# Patient Record
Sex: FEMALE | Race: ASIAN | NOT HISPANIC OR LATINO | Employment: UNEMPLOYED | URBAN - METROPOLITAN AREA
[De-identification: names, ages, dates, MRNs, and addresses within clinical notes are randomized per-mention and may not be internally consistent; named-entity substitution may affect disease eponyms.]

---

## 2023-05-09 LAB
ALLERGEN ANIMAL: CAT DANDER IGE (KU/L): <0.1 KU/L
ALLERGEN ANIMAL: DOG DANDER IGE (KU/L): <0.1 KU/L
ALLERGEN GRASS: BERMUDA GRASS (CYNODON DACTYLON) IGE (KU/L): <0.1 KU/L
ALLERGEN GRASS: JOHNSON GRASS (SORGHUM HALEPENSE) IGE (KU/L): <0.1 KU/L
ALLERGEN GRASS: MEADOW GRASS, KENTUCKY BLUE (POA PRATENSIS )IGE (KU/L): <0.1 KU/L
ALLERGEN GRASS: TIMOTHY GRASS (PHLEUM PRATENSE) IGE (KU/L): <0.1 KU/L
ALLERGEN INSECT: COCKROACH IGE: <0.1 KU/L
ALLERGEN MICROORGANISM: ALTERNARIA ALTERNATA IGE (KU/L): <0.1 KU/L
ALLERGEN MICROORGANISM: ASPERGILLUS FUMIGATUS IGE (KU/L): <0.1 KU/L
ALLERGEN MICROORGANISM: CLADOSPORIUM HERBARUM IGE (KU/L): <0.1 KU/L
ALLERGEN MICROORGANISM: PENICILLIUM CHRYSOGENUM (P. NOTATUM) IGE (KU/L): <0.1 KU/L
ALLERGEN MITE: DERMATOPHAGOIDES FARINAE (HOUSE DUST MITE) IGE (KU/L): <0.1 KU/L
ALLERGEN MITE: DERMATOPHAGOIDES PTERONYSSINUS (HOUSE DUST MITE) IGE (KU/L): <0.1 KU/L
ALLERGEN TREE: BOX-ELDER (ACER NEGUNDO) IGE (KU/L): <0.1 KU/L
ALLERGEN TREE: COMMON SILVER BIRCH (BETULA VERRUCOSA) IGE (KU/L): <0.1 KU/L
ALLERGEN TREE: COTTONWOOD (POPULUS DELTOIDES) IGE (KU/L): <0.1 KU/L
ALLERGEN TREE: ELM (ULMUS AMERICANA) IGE (KU/L): <0.1 KU/L
ALLERGEN TREE: MAPLE LEAF SYCAMORE, LONDON PLANE IGE (KU/L): <0.1 KU/L
ALLERGEN TREE: MOUNTAIN JUNIPER (JUNIPERUS SABINOIDES) IGE (KU/L): <0.1 KU/L
ALLERGEN TREE: MULBERRY (MORUS ALBA) IGE (KU/L): <0.1 KU/L
ALLERGEN TREE: OAK (QUERCUS ALBA) IGE (KU/L): <0.1 KU/L
ALLERGEN TREE: PECAN, HICKORY (CARYA PECAN) IGE (KU/L): <0.1 KU/L
ALLERGEN TREE: WALNUT IGE: <0.1 KU/L
ALLERGEN TREE: WHITE ASH (FRAXINUS AMERICANA) IGE (KU/L): <0.1 KU/L
ALLERGEN WEED: COMMON PIGWEED (AMARANTHUS RETROFLEXUS) IGE (KU/L): <0.1 KU/L
ALLERGEN WEED: COMMON RAGWEED (AMB. ARTEMISIIFOLIA/A. ELATIOR) IGE (KU/L): <0.1 KU/L
ALLERGEN WEED: GOOSEFOOT, LAMB'S QUARTERS (CHENOPODIUM ALBUM) IGE (KU/L): <0.1 KU/L
ALLERGEN WEED: PLANTAIN (ENGLISH), RIBWORT (PLANTAGO LANCEOLATA) IGE (KU/L): <0.1 KU/L
ALLERGEN WEED: PRICKLY SALTWORT/RUSSIAN THISTLE (SALSOLA KALI) IGE (KU/L): <0.1 KU/L
ALLERGEN WEED: SHEEP SORREL (RUMEX ACETOSELLA) IGE (KU/L): <0.1 KU/L
IMMUNOCAP IGE: 27 KU/L (ref 0–214)
IMMUNOCAP INTERPRETATION: NORMAL

## 2024-01-31 PROBLEM — M25.511 RIGHT SHOULDER PAIN: Status: ACTIVE | Noted: 2024-01-31

## 2024-01-31 PROBLEM — R22.2 CHEST WALL MASS: Status: ACTIVE | Noted: 2024-01-31

## 2024-01-31 PROBLEM — M94.0 ACUTE COSTOCHONDRITIS: Status: ACTIVE | Noted: 2024-01-31

## 2024-01-31 PROBLEM — E55.9 VITAMIN D DEFICIENCY: Status: ACTIVE | Noted: 2018-12-07

## 2024-01-31 PROBLEM — K21.9 GERD (GASTROESOPHAGEAL REFLUX DISEASE): Status: ACTIVE | Noted: 2024-01-31

## 2024-01-31 PROBLEM — R07.9 CHEST PAIN: Status: ACTIVE | Noted: 2024-01-31

## 2024-02-29 ENCOUNTER — HOSPITAL ENCOUNTER (OUTPATIENT)
Dept: RADIOLOGY | Facility: EXTERNAL LOCATION | Age: 49
Discharge: HOME | End: 2024-02-29

## 2024-02-29 ENCOUNTER — OFFICE VISIT (OUTPATIENT)
Dept: PRIMARY CARE | Facility: CLINIC | Age: 49
End: 2024-02-29
Payer: COMMERCIAL

## 2024-02-29 VITALS
DIASTOLIC BLOOD PRESSURE: 70 MMHG | TEMPERATURE: 97.3 F | HEIGHT: 65 IN | BODY MASS INDEX: 31.82 KG/M2 | SYSTOLIC BLOOD PRESSURE: 109 MMHG | HEART RATE: 71 BPM | OXYGEN SATURATION: 99 % | WEIGHT: 191 LBS

## 2024-02-29 DIAGNOSIS — Z00.00 VISIT FOR PREVENTIVE HEALTH EXAMINATION: Primary | ICD-10-CM

## 2024-02-29 DIAGNOSIS — Z12.11 SCREENING FOR MALIGNANT NEOPLASM OF COLON: ICD-10-CM

## 2024-02-29 DIAGNOSIS — Z12.31 ENCOUNTER FOR SCREENING MAMMOGRAM FOR MALIGNANT NEOPLASM OF BREAST: ICD-10-CM

## 2024-02-29 LAB
NON-UH HIE A/G RATIO: 1
NON-UH HIE ALB: 3.6 G/DL (ref 3.4–5)
NON-UH HIE ALK PHOS: 104 UNIT/L (ref 45–117)
NON-UH HIE APPEARANCE, U: NORMAL
NON-UH HIE BACTERIA, U: NORMAL
NON-UH HIE BILIRUBIN, TOTAL: 0.5 MG/DL (ref 0.3–1.2)
NON-UH HIE BILIRUBIN, U: NEGATIVE
NON-UH HIE BLOOD, U: NEGATIVE
NON-UH HIE BUN/CREAT RATIO: 12.2
NON-UH HIE BUN: 11 MG/DL (ref 9–23)
NON-UH HIE CALCIUM: 9.7 MG/DL (ref 8.7–10.4)
NON-UH HIE CALCULATED LDL CHOLESTEROL: 82 MG/DL (ref 60–130)
NON-UH HIE CALCULATED OSMOLALITY: 276 MOSM/KG (ref 275–295)
NON-UH HIE CHLORIDE: 107 MMOL/L (ref 98–107)
NON-UH HIE CHOLESTEROL: 165 MG/DL (ref 100–200)
NON-UH HIE CO2, VENOUS: 25 MMOL/L (ref 20–31)
NON-UH HIE COLOR, U: YELLOW
NON-UH HIE CREATININE: 0.9 MG/DL (ref 0.5–0.8)
NON-UH HIE GFR AA: >60
NON-UH HIE GLOBULIN: 3.7 G/DL
NON-UH HIE GLOMERULAR FILTRATION RATE: >60 ML/MIN/1.73M?
NON-UH HIE GLUCOSE QUAL, U: NEGATIVE
NON-UH HIE GLUCOSE: 79 MG/DL (ref 74–106)
NON-UH HIE GOT: 21 UNIT/L (ref 15–37)
NON-UH HIE GPT: 20 UNIT/L (ref 10–49)
NON-UH HIE HCT: 42.4 % (ref 36–46)
NON-UH HIE HDL CHOLESTEROL: 62 MG/DL (ref 40–60)
NON-UH HIE HGB: 14.6 G/DL (ref 12–16)
NON-UH HIE INSTR WBC ND: 10
NON-UH HIE K: 4.6 MMOL/L (ref 3.5–5.1)
NON-UH HIE KETONES, U: NEGATIVE
NON-UH HIE LEUKOCYTE ESTERASE, U: NEGATIVE
NON-UH HIE MCH: 27.5 PG (ref 27–34)
NON-UH HIE MCHC: 34.3 G/DL (ref 32–37)
NON-UH HIE MCV: 80.1 FL (ref 80–100)
NON-UH HIE MPV: 9 FL (ref 7.4–10.4)
NON-UH HIE NA: 139 MMOL/L (ref 135–145)
NON-UH HIE NITRITE, U: NEGATIVE
NON-UH HIE PH, U: 5 (ref 4.5–8)
NON-UH HIE PLATELET: 278 X10 (ref 150–450)
NON-UH HIE PROTEIN, U: NEGATIVE
NON-UH HIE RBC/HPF, U: <1 #/HPF (ref 0–3)
NON-UH HIE RBC: 5.3 X10 (ref 4.2–5.4)
NON-UH HIE RDW: 15.6 % (ref 11.5–14.5)
NON-UH HIE SPECIFIC GRAVITY, U: 1.01 (ref 1–1.03)
NON-UH HIE SQUAMOUS EPITHELIAL CELLS, U: 3 #/HPF
NON-UH HIE TOTAL CHOL/HDL CHOL RATIO: 2.7
NON-UH HIE TOTAL PROTEIN: 7.3 G/DL (ref 5.7–8.2)
NON-UH HIE TRIGLYCERIDES: 105 MG/DL (ref 30–150)
NON-UH HIE TSH: 0.55 UIU/ML (ref 0.55–4.78)
NON-UH HIE U MICRO: NORMAL
NON-UH HIE UROBILINOGEN QUAL, U: NORMAL
NON-UH HIE WBC/HPF, U: 3 #/HPF (ref 0–5)
NON-UH HIE WBC: 10 X10 (ref 4.5–11)

## 2024-02-29 PROCEDURE — 99396 PREV VISIT EST AGE 40-64: CPT | Performed by: FAMILY MEDICINE

## 2024-02-29 PROCEDURE — 93000 ELECTROCARDIOGRAM COMPLETE: CPT | Performed by: FAMILY MEDICINE

## 2024-02-29 PROCEDURE — 1036F TOBACCO NON-USER: CPT | Performed by: FAMILY MEDICINE

## 2024-02-29 RX ORDER — CHOLECALCIFEROL (VITAMIN D3) 25 MCG
TABLET ORAL
COMMUNITY

## 2024-02-29 RX ORDER — PROGESTERONE 100 MG/1
100 CAPSULE ORAL DAILY
COMMUNITY

## 2024-02-29 RX ORDER — SODIUM CHLORIDE/ALOE VERA
GEL (GRAM) NASAL
COMMUNITY
Start: 2023-05-08

## 2024-02-29 RX ORDER — LEVOTHYROXINE SODIUM 112 UG/1
TABLET ORAL
COMMUNITY
Start: 2023-12-04

## 2024-02-29 RX ORDER — TRIAMCINOLONE ACETONIDE 1 MG/G
CREAM TOPICAL
COMMUNITY
Start: 2024-02-08

## 2024-02-29 NOTE — PROGRESS NOTES
Subjective   Patient ID: Juliana Maldonado is a 48 y.o. female who presents for Annual Exam.    Assessment/Plan     Problem List Items Addressed This Visit    None  Visit Diagnoses       Visit for preventive health examination    -  Primary    Relevant Orders    TSH with reflex to Free T4 if abnormal    Lipid Panel    Comprehensive Metabolic Panel    CBC    Urinalysis with Reflex Microscopic    CT cardiac scoring wo IV contrast    ECG 12 lead (Clinic Performed) (Completed)    Screening for malignant neoplasm of colon        Relevant Orders    Cologuard® colon cancer screening    Encounter for screening mammogram for malignant neoplasm of breast        Relevant Orders    BI mammo bilateral screening tomosynthesis (Completed)        Discussed about diet exercise  Discussed about age-related immunization  Discussed about fasting lab work  Follow-up every year for physical  Mammogram order provided  Pap smear follow-up with OB/GYN  Colon cancer screening Cologuard today  Calcium score ordered  Come back early with any new signs and symptoms  Patient understood and agreed with plan.    HPI    48-year-old female new patient to me here for physical    No smoking or alcohol    Exercise - walking    Wants see gyn for uterine fibroid    No new signs and symptoms today  Hypothyroidism taking levothyroxine  Check lab work today fasting lab work    Received immunization      No Known Allergies    Current Outpatient Medications   Medication Sig Dispense Refill    [place] Saline gel topical gel       cholecalciferol (Vitamin D-3) 25 MCG (1000 UT) tablet Take by mouth.      progesterone (Prometrium) 100 mg capsule Take 1 capsule (100 mg) by mouth once daily.      Synthroid 112 mcg tablet       triamcinolone (Kenalog) 0.1 % cream Apply twice daily for 2 weeks, to neck and back.       No current facility-administered medications for this visit.       Objective   Visit Vitals  /70 (BP Location: Left arm, Patient Position:  "Sitting)   Pulse 71   Temp 36.3 °C (97.3 °F)   Ht 1.651 m (5' 5\")   Wt 86.6 kg (191 lb)   SpO2 99%   BMI 31.78 kg/m²   Smoking Status Never   BSA 1.99 m²     Physical Exam    Constitutional   General appearance: Alert and in no acute distress.   Head and Face   Head and face: Normal.     Palpation of the face and sinuses: Normal.    Eyes   Inspection of eyes: Sclera and conjunctiva were normal.    Pupil exam: Pupils were equal in size. Extraocular movements were intact.   Ears, Nose, Mouth, and Throat   Ears: Auricles: Normal.    Otoscopic examination: Tympanic membranes: Normal with no congestion and no discharge. Otic Canals: Normal without tenderness, congestion or discharge.    Hearing: Normal.     Nasal mucosa, septum, and turbinates: Normal without edema or erythema.    Lips, teeth, and gums: Normal.    Oropharynx: Normal with moist mucus membranes, no congestion. Tonsils: Normal no follicles.   Neck   Neck Exam: Appearance of the neck was normal. No neck masses observed.    Thyroid exam: Not enlarged and no palpable thyroid nodules.   Pulmonary   Respiratory assessment: No respiratory distress, normal respiratory rhythm and effort.    Auscultation of Lungs: Clear bilateral breath sounds.   Cardiovascular   Auscultation of heart: Apical pulse normal, heart rate and rhythm normal, normal S1 and S2, no murmurs and no pericardial rub.    Carotid arteries: Pulses normal with no bruits.    Exam for edema: No peripheral edema.   Chest   Chest: Normal A_P diameter, no pulsation, no intercostal withdrawing. Trachea central.   Abdomen   Abdominal Exam: No bruits, normal bowel sounds, soft, non-tender, no abdominal mass palpated.    Liver and Spleen exam: No hepato-splenomegaly.    Examination for hernias: Normal.      Lymphatic   Palpation of lymph nodes in neck: No cervical lymphadenopathy.   Musculoskeletal   Examination of gait: Normal.    Inspection of digits and nails: No clubbing or cyanosis of the fingernails.  "   Inspection/palpation of joints, bones and muscles: No joint swelling. Normal movement of all extremities.    Range of Motion: Normal movement of all extremities.   Skin   Skin inspection: Normal skin color and pigmentation, normal skin turgor and no visible rash.   Neurologic   Cranial nerves: Nerves 2-12 were intact, no focal neuro defects.    Reflexes: Normal.     Sensation: Normal.     Coordination: Normal.    Psychiatric   Judgment and insight: Intact.    Orientation: Oriented to person, place, and time.    Recent and remote memory: Normal.     Mood and affect: Normal.     Genitourinary -follow-up with OB/GYN   Immunization History   Administered Date(s) Administered    Flu vaccine (IIV4), preservative free *Check age/dose* 10/25/2016, 09/14/2017, 09/15/2020    Flu vaccine, quadrivalent, no egg protein, age 6 month or greater (FLUCELVAX) 11/14/2019, 10/08/2023    Flu vaccine, quadrivalent, recombinant, preservative free, adult (FLUBLOK) 09/28/2021, 09/19/2022    Moderna COVID-19 vaccine, Fall 2023, 12 yeasrs and older (50mcg/0.5mL) 10/08/2023    Moderna SARS-CoV-2 Vaccination 04/09/2021, 05/07/2021, 11/15/2021, 07/26/2022    Pfizer COVID-19 vaccine, bivalent, age 12 years and older (30 mcg/0.3 mL) 12/01/2022       Review of Systems    No visits with results within 4 Month(s) from this visit.   Latest known visit with results is:   Orders Only on 05/08/2023   Component Date Value Ref Range Status    Immunocap IgE 05/08/2023 27.0  0.0 - 214.0 KU/L Final    [place]erm[place] Grass IgE 05/08/2023 <0.10  <0.35 KU/L Final    Marvin Grass IgE 05/08/2023 <0.10  <0.35 KU/L Final    [place] Grass, [place] Blue IgE 05/08/2023 <0.10  <0.35 KU/L Final    Graham Grass IgE 05/08/2023 <0.10  <0.35 KU/L Final    [place], [place] IgE 05/08/2023 <0.10  <0.35 KU/L Final    Common Pigweed IgE 05/08/2023 <0.10  <0.35 KU/L Final    Common Ragweed IgE 05/08/2023 <0.10  <0.35 KU/L Final    White Pedro Luis IgE 05/08/2023 <0.10  <0.35 KU/L  Final    Common Silver Birch IgE 2023 <0.10  <0.35 KU/L Final    Box-Elder IgE 2023 <0.10  <0.35 KU/L Final    Mountain Juniper IgE 2023 <0.10  <0.35 KU/L Final    [place] IgE 2023 <0.10  <0.35 KU/L Final    Elm IgE 2023 <0.10  <0.35 KU/L Final    Tyler IgE 2023 <0.10  <0.35 KU/L Final    Oak IgE 2023 <0.10  <0.35 KU/L Final    Pecan, Kidder IgE 2023 <0.10  <0.35 KU/L Final    Maple [place], [place] * 2023 <0.10  <0.35 KU/L Final    [place]une[place] Tree IgE 2023 <0.10  <0.35 KU/L Final    Prickly Saltwort/Russian Thistle I* 2023 <0.10  <0.35 KU/L Final    [place] IgE 2023 <0.10  <0.35 KU/L Final    Cat Dander IgE 2023 <0.10  <0.35 KU/L Final    Dog Dander IgE 2023 <0.10  <0.35 KU/L Final    Alternaria Alternata IgE 2023 <0.10  <0.35 KU/L Final    Aspergillus Fumigatus IgE 2023 <0.10  <0.35 KU/L Final    Cladosporium Herbarum IgE 2023 <0.10  <0.35 KU/L Final    Penicillium Chrysogenum (P. notatu* 2023 <0.10  <0.35 KU/L Final    Plantain IgE 2023 <0.10  <0.35 KU/L Final    Dust Mite (D. farinae) IgE 2023 <0.10  <0.35 KU/L Final    Dust Mite (D. pteronyssinus) IgE 2023 <0.10  <0.35 KU/L Final    Algerian Cockroach IgE 2023 <0.10  <0.35 KU/L Final    Immunocap Interpretation 2023 SEE COMMENT   Final       Radiology: Reviewed imaging in powerchart.  US pelvis transvaginal    Result Date: 2024  [facility] P[facility]TR[facility] REPORT Juliana Maldonado is a  48 year old  who presents for Pelvic pain in female , pmb. Pelvic Ultrasound transvaginal Procedure Date:  2024 LMP: Patient's last menstrual period was 2022 (exact date). Patient is menopausal. PMB 23-24                  Uterus: 6.6 x4.7 x4.3cm anteverted Possible fibroid uterus fundal 1.3 x1.2 x1.3cm  (1.6cm ) Endo: 8.0 mm (12.7mm ) Right Ovary:  1.8 x1.5  x1.2 cm  Right Ovarian Follicle 0.4 x0.5 x0.3 cm (0.7cm 11/20) Left Ovary:  1.5 x1.1 x1.1 cm  Left Ovarian Follicle 0.3 x0.3 x0.2 cm (1.4cm 11/20) Free Fluid:  NO Sonographer's Impression:  Heterogenous Uterus, possible Fibroid Uterus, Ovarian Follicles seen Bilaterally, Thickened Endometrium , nabothian cyst 0.6 x0.5 x0.6cm (0.6cm 11/20) Additional comments:  limited exam due to peristalsing bowel Doppler flow unavailable. Maisha Mora [facility]       Sonographer    Uterus normal size; fibroid stable 1.3 cm fundal. endometrium 8.0 mm no free fluid.       No family history on file.  Social History     Socioeconomic History    Marital status:      Spouse name: None    Number of children: None    Years of education: None    Highest education level: None   Occupational History    None   Tobacco Use    Smoking status: Never    Smokeless tobacco: Never   Substance and Sexual Activity    Alcohol use: Never    Drug use: Never    Sexual activity: None   Other Topics Concern    None   Social History Narrative    None     Social Determinants of Health     Financial Resource Strain: Not on file   Food Insecurity: Not on file   Transportation Needs: Not on file   Physical Activity: Not on file   Stress: Not on file   Social Connections: Not on file   Intimate Partner Violence: Not on file   Housing Stability: Not on file     Past Medical History:   Diagnosis Date    Alveolar proteinosis (CMS/HCC)     PAP (pulmonary alveolar proteinosis)    Personal history of other endocrine, nutritional and metabolic disease     History of thyroid disease    Personal history of other medical treatment     History of mammogram     Past Surgical History:   Procedure Laterality Date    OTHER SURGICAL HISTORY  03/17/2021    Resection    OTHER SURGICAL HISTORY  02/09/2021    Tubal ligation       Charting was completed using voice recognition technology and may include unintended errors.

## 2024-03-01 ENCOUNTER — HOSPITAL ENCOUNTER (OUTPATIENT)
Dept: RADIOLOGY | Facility: CLINIC | Age: 49
Discharge: HOME | End: 2024-03-01
Payer: COMMERCIAL

## 2024-03-01 ENCOUNTER — HOSPITAL ENCOUNTER (OUTPATIENT)
Dept: RADIOLOGY | Facility: HOSPITAL | Age: 49
Discharge: HOME | End: 2024-03-01

## 2024-03-01 VITALS — BODY MASS INDEX: 31.65 KG/M2 | WEIGHT: 190 LBS | HEIGHT: 65 IN

## 2024-03-01 DIAGNOSIS — Z12.31 ENCOUNTER FOR SCREENING MAMMOGRAM FOR MALIGNANT NEOPLASM OF BREAST: ICD-10-CM

## 2024-03-01 DIAGNOSIS — Z00.00 VISIT FOR PREVENTIVE HEALTH EXAMINATION: ICD-10-CM

## 2024-03-01 PROCEDURE — 77067 SCR MAMMO BI INCL CAD: CPT

## 2024-03-01 PROCEDURE — 77063 BREAST TOMOSYNTHESIS BI: CPT

## 2024-03-01 PROCEDURE — 75571 CT HRT W/O DYE W/CA TEST: CPT

## 2024-03-07 ENCOUNTER — HOSPITAL ENCOUNTER (OUTPATIENT)
Dept: RADIOLOGY | Facility: EXTERNAL LOCATION | Age: 49
Discharge: HOME | End: 2024-03-07

## 2024-03-14 ENCOUNTER — APPOINTMENT (OUTPATIENT)
Dept: PRIMARY CARE | Facility: CLINIC | Age: 49
End: 2024-03-14
Payer: COMMERCIAL

## 2024-03-14 LAB — NONINV COLON CA DNA+OCC BLD SCRN STL QL: NEGATIVE

## 2024-03-18 ENCOUNTER — TELEPHONE (OUTPATIENT)
Dept: PRIMARY CARE | Facility: CLINIC | Age: 49
End: 2024-03-18
Payer: COMMERCIAL

## 2024-03-18 NOTE — TELEPHONE ENCOUNTER
Patient looking for blood work results. She said she never got any phone call. Do you know if Dr. Junior looked at the results ?  
40.6

## 2024-04-08 ENCOUNTER — APPOINTMENT (OUTPATIENT)
Dept: OBSTETRICS AND GYNECOLOGY | Facility: CLINIC | Age: 49
End: 2024-04-08
Payer: COMMERCIAL

## 2024-07-08 ENCOUNTER — APPOINTMENT (OUTPATIENT)
Dept: PRIMARY CARE | Facility: CLINIC | Age: 49
End: 2024-07-08
Payer: COMMERCIAL

## 2024-07-08 VITALS
HEART RATE: 69 BPM | HEIGHT: 65 IN | WEIGHT: 197 LBS | SYSTOLIC BLOOD PRESSURE: 110 MMHG | BODY MASS INDEX: 32.82 KG/M2 | OXYGEN SATURATION: 97 % | DIASTOLIC BLOOD PRESSURE: 77 MMHG

## 2024-07-08 DIAGNOSIS — N93.9 UTERINE BLEEDING: ICD-10-CM

## 2024-07-08 DIAGNOSIS — Z01.818 PRE-OP EXAMINATION: Primary | ICD-10-CM

## 2024-07-08 DIAGNOSIS — D25.9 UTERINE LEIOMYOMA, UNSPECIFIED LOCATION: ICD-10-CM

## 2024-07-08 DIAGNOSIS — E03.9 HYPOTHYROIDISM, UNSPECIFIED TYPE: ICD-10-CM

## 2024-07-08 LAB
NON-UH HIE APPEARANCE, U: CLEAR
NON-UH HIE BACTERIA, U: ABNORMAL
NON-UH HIE BILIRUBIN, U: NEGATIVE
NON-UH HIE BLOOD, U: ABNORMAL
NON-UH HIE COLOR, U: ABNORMAL
NON-UH HIE DXH ACTIONS: ABNORMAL
NON-UH HIE GLUCOSE QUAL, U: NEGATIVE
NON-UH HIE HCT: 43 % (ref 36–46)
NON-UH HIE HGB: 14.7 G/DL (ref 12–16)
NON-UH HIE INSTR WBC ND: 10.1
NON-UH HIE KETONES, U: NEGATIVE
NON-UH HIE LEUKOCYTE ESTERASE, U: NEGATIVE
NON-UH HIE MCH: 27.5 PG (ref 27–34)
NON-UH HIE MCHC: 34 G/DL (ref 32–37)
NON-UH HIE MCV: 80.7 FL (ref 80–100)
NON-UH HIE MPV: 8.8 FL (ref 7.4–10.4)
NON-UH HIE NITRITE, U: NEGATIVE
NON-UH HIE PH, U: 6 (ref 4.5–8)
NON-UH HIE PLATELET: 294 X10 (ref 150–450)
NON-UH HIE PROTEIN, U: NEGATIVE
NON-UH HIE RBC/HPF, U: 1 #/HPF (ref 0–3)
NON-UH HIE RBC: 5.34 X10 (ref 4.2–5.4)
NON-UH HIE RDW: 15.3 % (ref 11.5–14.5)
NON-UH HIE SPECIFIC GRAVITY, U: 1.01 (ref 1–1.03)
NON-UH HIE SQUAMOUS EPITHELIAL CELLS, U: 1 #/HPF
NON-UH HIE U MICRO: ABNORMAL
NON-UH HIE UROBILINOGEN QUAL, U: ABNORMAL
NON-UH HIE WBC/HPF, U: 1 #/HPF (ref 0–5)
NON-UH HIE WBC: 10.1 X10 (ref 4.5–11)

## 2024-07-08 PROCEDURE — 93000 ELECTROCARDIOGRAM COMPLETE: CPT | Performed by: FAMILY MEDICINE

## 2024-07-08 PROCEDURE — 99213 OFFICE O/P EST LOW 20 MIN: CPT | Performed by: FAMILY MEDICINE

## 2024-07-08 PROCEDURE — 1036F TOBACCO NON-USER: CPT | Performed by: FAMILY MEDICINE

## 2024-07-08 RX ORDER — MEGESTROL ACETATE 20 MG/1
TABLET ORAL
COMMUNITY
Start: 2024-04-26 | End: 2024-07-08 | Stop reason: ALTCHOICE

## 2024-07-08 NOTE — PROGRESS NOTES
"Subjective   Patient ID: Juliana Maldonado is a 49 y.o. female who presents for Pre-op Exam.    Assessment/Plan     Problem List Items Addressed This Visit       Hypothyroidism     Other Visit Diagnoses       Pre-op examination    -  Primary    Relevant Orders    ECG 12 Lead    Type And Screen    Urinalysis with Reflex Microscopic    CBC    Urine Culture    Uterine bleeding        Uterine leiomyoma, unspecified location            Discontinue anti-inflammatories 7 days before surgery   Continue rest of the medication   EKG today   Patient is at low risk for cardiac complication from noncardiac surgery.  Lab work today  We will clear patient for surgery after reviewing lab work      Previous visit  Discussed about diet exercise  Discussed about age-related immunization  Discussed about fasting lab work  Follow-up every year for physical  Mammogram order provided  Pap smear follow-up with OB/GYN  Colon cancer screening Cologuard today  Calcium score ordered  Come back early with any new signs and symptoms  Patient understood and agreed with plan.    HPI    49-year-old female here for readmission testing for robotic hysterectomy with bilateral salpingo-oophorectomy bilateral salpingectomy will be done by Dr. Hernandez on 7/15/2024  For uterine bleeding secondary to uterine fibroid    No smoking or alcohol    Exercise - walking    No new signs and symptoms today  Hypothyroidism taking levothyroxine  Check lab work today    Received immunization      No Known Allergies    Current Outpatient Medications   Medication Sig Dispense Refill    cholecalciferol (Vitamin D-3) 25 MCG (1000 UT) tablet Take by mouth.      Synthroid 112 mcg tablet       progesterone (Prometrium) 100 mg capsule Take 1 capsule (100 mg) by mouth once daily.       No current facility-administered medications for this visit.       Objective   Visit Vitals  /77 (BP Location: Left arm, Patient Position: Sitting)   Pulse 69   Ht 1.651 m (5' 5\")   Wt " 89.4 kg (197 lb)   SpO2 97%   BMI 32.78 kg/m²   OB Status Perimenopausal   Smoking Status Never   BSA 2.02 m²     Physical Exam    Constitutional:       General: He is not in acute distress.     Appearance: Normal appearance.   HENT:      Head: Normocephalic and atraumatic.      Nose: Nose normal.   Eyes:      Extraocular Movements: Extraocular movements intact.      Conjunctiva/sclera: Conjunctivae normal.   Cardiovascular:      Rate and Rhythm: Normal rate and regular rhythm.   Pulmonary:      Effort: Pulmonary effort is normal.      Breath sounds: Normal breath sounds.   Skin:     General: Skin is warm.   Neurological:      Mental Status: He is alert and oriented to person, place, and time.   Psychiatric:         Mood and Affect: Mood normal.         Behavior: Behavior normal.       Immunization History   Administered Date(s) Administered    Flu vaccine (IIV4), preservative free *Check age/dose* 10/25/2016, 09/14/2017, 09/15/2020    Flu vaccine, quadrivalent, no egg protein, age 6 month or greater (FLUCELVAX) 11/14/2019, 10/08/2023    Flu vaccine, quadrivalent, recombinant, preservative free, adult (FLUBLOK) 09/28/2021, 09/19/2022    Moderna COVID-19 vaccine, Fall 2023, 12 yeasrs and older (50mcg/0.5mL) 10/08/2023    Moderna SARS-CoV-2 Vaccination 04/09/2021, 05/07/2021, 11/15/2021, 07/26/2022    Pfizer COVID-19 vaccine, bivalent, age 12 years and older (30 mcg/0.3 mL) 12/01/2022       Review of Systems    No visits with results within 4 Month(s) from this visit.   Latest known visit with results is:   Orders Only on 02/29/2024   Component Date Value Ref Range Status    [facility] MCH 02/29/2024 27.5  27.0 - 34.0 pg Final    [facility] HCT 02/29/2024 42.4  36.0 - 46.0 % Final    [facility] Platelet 02/29/2024 278  150 - 450 x10 Final    [facility] RBC 02/29/2024 5.30  4.20 - 5.40 x10 Final    [facility] Instr WBC ND 02/29/2024 10.0   Final    [facility] MCHC 02/29/2024 34.3  32.0 - 37.0 g/dL Final    [facility]  MCV 02/29/2024 80.1  80.0 - 100.0 fL Final    [facility] HGB 02/29/2024 14.6  12.0 - 16.0 g/dL Final    [facility] MPV 02/29/2024 9.0  7.4 - 10.4 fL Final    [facility] RDW 02/29/2024 15.6 (H)  11.5 - 14.5 % Final    [facility] WBC 02/29/2024 10.0  4.5 - 11.0 x10 Final    [facility] Nitrite, U 02/29/2024 Negative  Negative Final    [facility] Bilirubin, U 02/29/2024 Negative  Negative Final    [facility] Color, U 02/29/2024 Yellow   Final    [facility] Squamous Epithelial Sarah* 02/29/2024 3  #/HPF Final    [facility] Protein, U 02/29/2024 Negative  Negative Final    [facility] Blood, U 02/29/2024 Negative  Negative Final    [facility] RBC/HPF, U 02/29/2024 <1  0 - 3 #/HPF Final    [facility] Ketones, U 02/29/2024 Negative  Negative Final    [facility] Leukocyte Esterase, U 02/29/2024 Negative  Negative Final    [facility] Appearance, U 02/29/2024 Hazy   Final    [facility] Bacteria, U 02/29/2024 Occasional   Final    [facility] Urobilinogen Qual, U 02/29/2024 <2.0 mg/dl  <2.0 mg/dl Final    [facility] WBC/HPF, U 02/29/2024 3  0 - 5 #/HPF Final    [facility] pH, U 02/29/2024 5.0  4.5 - 8.0 Final    [facility] Specific Gravity, U 02/29/2024 1.013  1.001 - 1.035 Final    [facility] U MICRO 02/29/2024 Indicated   Final    [facility] Glucose Qual, U 02/29/2024 Negative  Negative Final    [facility] BUN 02/29/2024 11  9 - 23 mg/dL Final    [facility] GOT 02/29/2024 21  15 - 37 unit/L Final    [facility] ALB 02/29/2024 3.6  3.4 - 5.0 g/dL Final    [facility] Glucose 02/29/2024 79  74 - 106 mg/dL Final    [facility] GFR AA 02/29/2024 >60   Final    [facility] Chloride 02/29/2024 107  98 - 107 mmol/L Final    [facility] Bilirubin, Total 02/29/2024 0.50  0.30 - 1.20 mg/dL Final    [facility] Na 02/29/2024 139  135 - 145 mmol/L Final    [facility] A/G Ratio 02/29/2024 1.0   Final    [facility] BUN/Creat Ratio 02/29/2024 12.2   Final    [facility] GPT 02/29/2024 20  10 - 49 unit/L Final    [facility] Alk Phos 02/29/2024 104  45  - 117 unit/L Final    [facility] Creatinine 2024 0.9 (H)  0.5 - 0.8 mg/dL Final    [facility] CO2, venous 2024 25.0  20.0 - 31.0 mmol/L Final    [facility] Total Protein 2024 7.3  5.7 - 8.2 g/dL Final    [facility] Glomerular Filtration R* 2024 >60  mL/min/1.73m? Final    [facility] K 2024 4.6  3.5 - 5.1 mmol/L Final    [facility] Calculated Osmolality 2024 276  275 - 295 mOsm/kg Final    [facility] Globulin 2024 3.7  g/dL Final    [facility] Calcium 2024 9.7  8.7 - 10.4 mg/dL Final    [facility] Cholesterol 2024 165  100 - 200 mg/dL Final    [facility] Calculated LDL Choleste* 2024 82  60 - 130 mg/dL Final    [facility] HDL Cholesterol 2024 62 (H)  40 - 60 mg/dL Final    [facility] Total Chol/HDL Chol Rat* 2024 2.7   Final    [facility] Triglycerides 2024 105  30 - 150 mg/dL Final    [facility] TSH 2024 0.55  0.55 - 4.78 uIU/ml Final       Radiology: Reviewed imaging in powerchart.  US pelvis transvaginal    Result Date: 2024  [facility] PELVIC ULTRASOUND REPORT Juliana Maldonado is a  48 year old  who presents for Pelvic pain in female , pmb. Pelvic Ultrasound transvaginal Procedure Date:  2024 LMP: Patient's last menstrual period was 2022 (exact date). Patient is menopausal. PMB 23-24                  Uterus: 6.6 x4.7 x4.3cm anteverted Possible fibroid uterus fundal 1.3 x1.2 x1.3cm  (1.6cm ) Endo: 8.0 mm (12.7mm ) Right Ovary:  1.8 x1.5 x1.2 cm  Right Ovarian Follicle 0.4 x0.5 x0.3 cm (0.7cm ) Left Ovary:  1.5 x1.1 x1.1 cm  Left Ovarian Follicle 0.3 x0.3 x0.2 cm (1.4cm ) Free Fluid:  NO Sonographer's Impression:  Heterogenous Uterus, possible Fibroid Uterus, Ovarian Follicles seen Bilaterally, Thickened Endometrium , nabothian cyst 0.6 x0.5 x0.6cm (0.6cm ) Additional comments:  limited exam due to peristalsing bowel Doppler flow unavailable.  Maisha Mora [facility]       Sonographer    Uterus normal size; fibroid stable 1.3 cm fundal. endometrium 8.0 mm no free fluid.       No family history on file.  Social History     Socioeconomic History    Marital status:      Spouse name: None    Number of children: None    Years of education: None    Highest education level: None   Occupational History    None   Tobacco Use    Smoking status: Never    Smokeless tobacco: Never   Substance and Sexual Activity    Alcohol use: Never    Drug use: Never    Sexual activity: None   Other Topics Concern    None   Social History Narrative    None     Social Determinants of Health     Financial Resource Strain: Not on file   Food Insecurity: Not on file   Transportation Needs: Not on file   Physical Activity: Not on file   Stress: Not on file   Social Connections: Not on file   Intimate Partner Violence: Not on file   Housing Stability: Not on file     Past Medical History:   Diagnosis Date    Alveolar proteinosis (Multi)     PAP (pulmonary alveolar proteinosis)    Personal history of other endocrine, nutritional and metabolic disease     History of thyroid disease    Personal history of other medical treatment     History of mammogram     Past Surgical History:   Procedure Laterality Date    BREAST BIOPSY Left     benign    OTHER SURGICAL HISTORY  03/17/2021    Resection    OTHER SURGICAL HISTORY  02/09/2021    Tubal ligation       Charting was completed using voice recognition technology and may include unintended errors.

## 2024-07-31 ENCOUNTER — TELEPHONE (OUTPATIENT)
Dept: PRIMARY CARE | Facility: CLINIC | Age: 49
End: 2024-07-31
Payer: COMMERCIAL

## 2024-07-31 NOTE — TELEPHONE ENCOUNTER
Pt discharged yesterday 07/30/24   I spoke with pt today & she is feeling ok, no questions or concerns  She did make a rosy followup for next tues 08/06/24 at 1:30pm

## 2024-08-06 ENCOUNTER — LAB (OUTPATIENT)
Dept: LAB | Facility: LAB | Age: 49
End: 2024-08-06
Payer: COMMERCIAL

## 2024-08-06 ENCOUNTER — APPOINTMENT (OUTPATIENT)
Dept: PRIMARY CARE | Facility: CLINIC | Age: 49
End: 2024-08-06
Payer: COMMERCIAL

## 2024-08-06 VITALS
HEART RATE: 101 BPM | BODY MASS INDEX: 31.95 KG/M2 | SYSTOLIC BLOOD PRESSURE: 99 MMHG | WEIGHT: 192 LBS | DIASTOLIC BLOOD PRESSURE: 70 MMHG | OXYGEN SATURATION: 99 % | TEMPERATURE: 97.9 F

## 2024-08-06 DIAGNOSIS — K65.1 INTRA-ABDOMINAL ABSCESS (MULTI): Primary | ICD-10-CM

## 2024-08-06 DIAGNOSIS — E03.9 HYPOTHYROIDISM, UNSPECIFIED TYPE: ICD-10-CM

## 2024-08-06 DIAGNOSIS — K65.1 INTRA-ABDOMINAL ABSCESS (MULTI): ICD-10-CM

## 2024-08-06 DIAGNOSIS — R05.1 ACUTE COUGH: ICD-10-CM

## 2024-08-06 LAB
CRP SERPL-MCNC: 18.55 MG/DL
TSH SERPL-ACNC: 1.62 MIU/L (ref 0.44–3.98)

## 2024-08-06 PROCEDURE — 1036F TOBACCO NON-USER: CPT | Performed by: FAMILY MEDICINE

## 2024-08-06 PROCEDURE — 36415 COLL VENOUS BLD VENIPUNCTURE: CPT

## 2024-08-06 PROCEDURE — 99495 TRANSJ CARE MGMT MOD F2F 14D: CPT | Performed by: FAMILY MEDICINE

## 2024-08-06 PROCEDURE — 86140 C-REACTIVE PROTEIN: CPT

## 2024-08-06 PROCEDURE — 80048 BASIC METABOLIC PNL TOTAL CA: CPT

## 2024-08-06 PROCEDURE — 84443 ASSAY THYROID STIM HORMONE: CPT

## 2024-08-06 PROCEDURE — 85025 COMPLETE CBC W/AUTO DIFF WBC: CPT

## 2024-08-06 RX ORDER — METRONIDAZOLE 500 MG/1
500 TABLET ORAL 3 TIMES DAILY
Qty: 21 TABLET | Refills: 0 | Status: SHIPPED | OUTPATIENT
Start: 2024-08-06 | End: 2024-08-13

## 2024-08-06 RX ORDER — DOCUSATE SODIUM 100 MG/1
100 CAPSULE, LIQUID FILLED ORAL
COMMUNITY
Start: 2024-07-29

## 2024-08-06 RX ORDER — BENZONATATE 100 MG/1
200 CAPSULE ORAL 3 TIMES DAILY PRN
Qty: 30 CAPSULE | Refills: 0 | Status: SHIPPED | OUTPATIENT
Start: 2024-08-06 | End: 2024-08-11

## 2024-08-06 RX ORDER — LEVOTHYROXINE SODIUM 112 UG/1
112 TABLET ORAL DAILY
Qty: 90 TABLET | Refills: 3 | Status: SHIPPED | OUTPATIENT
Start: 2024-08-06 | End: 2025-08-06

## 2024-08-06 RX ORDER — BROMPHENIRAMINE MALEATE, PSEUDOEPHEDRINE HYDROCHLORIDE, AND DEXTROMETHORPHAN HYDROBROMIDE 2; 30; 10 MG/5ML; MG/5ML; MG/5ML
5 SYRUP ORAL 4 TIMES DAILY PRN
Qty: 120 ML | Refills: 0 | Status: SHIPPED | OUTPATIENT
Start: 2024-08-06 | End: 2024-08-16

## 2024-08-06 NOTE — PROGRESS NOTES
Subjective   Patient ID: Juliana Maldonado is a 49 y.o. female who presents for Hospital Follow-up.    Assessment/Plan     Problem List Items Addressed This Visit    None      HPI    Patient was admitted with abdominal pain found to have intra-abdominal abscess status post CT-guided drainage  Drain was recently removed  Was discharged on Invanz -wound culture positive for Bacteroides fragilis  ID and gynecology consulted      CBC BMP today    Recently had a hysterectomy with bilateral salpingo-oophorectomy in July 2024    Anemia received IV iron    Consider Flagyl for a week  Complaining of cough continue symptomatic treatment  PICC line and CAMRYN drain removed    Patient denies any fever chills abdominal pain much better    Allergies   Allergen Reactions    Oxycodone Other     too strong       Current Outpatient Medications   Medication Sig Dispense Refill    docusate sodium (Colace) 100 mg capsule 1 capsule (100 mg).      Synthroid 112 mcg tablet       cholecalciferol (Vitamin D-3) 25 MCG (1000 UT) tablet Take by mouth.      progesterone (Prometrium) 100 mg capsule Take 1 capsule (100 mg) by mouth once daily.       No current facility-administered medications for this visit.       Objective   Visit Vitals  BP 99/70 (BP Location: Left arm)   Pulse 101   Temp 36.6 °C (97.9 °F)   Wt 87.1 kg (192 lb)   SpO2 99%   BMI 31.95 kg/m²   OB Status Perimenopausal   Smoking Status Never   BSA 2 m²     Physical Exam  Constitutional:       General: He is not in acute distress.     Appearance: Normal appearance.   HENT:      Head: Normocephalic and atraumatic.      Nose: Nose normal.   Eyes:      Extraocular Movements: Extraocular movements intact.      Conjunctiva/sclera: Conjunctivae normal.   Cardiovascular:      Rate and Rhythm: Normal rate and regular rhythm.   Pulmonary:      Effort: Pulmonary effort is normal.      Breath sounds: Normal breath sounds.   Skin:     General: Skin is warm.   Neurological:      Mental Status:  He is alert and oriented to person, place, and time.   Psychiatric:         Mood and Affect: Mood normal.         Behavior: Behavior normal.   Immunization History   Administered Date(s) Administered    Flu vaccine (IIV4), preservative free *Check age/dose* 10/25/2016, 09/14/2017, 11/14/2019, 09/15/2020    Flu vaccine, quadrivalent, no egg protein, age 6 month or greater (FLUCELVAX) 11/14/2019, 10/08/2023    Flu vaccine, quadrivalent, recombinant, preservative free, adult (FLUBLOK) 09/28/2021, 09/19/2022    Moderna COVID-19 vaccine, Fall 2023, 12 yeasrs and older (50mcg/0.5mL) 10/08/2023    Moderna SARS-CoV-2 Vaccination 04/09/2021, 05/07/2021, 11/15/2021, 07/26/2022    Pfizer COVID-19 vaccine, bivalent, age 12 years and older (30 mcg/0.3 mL) 12/01/2022       Review of Systems    Orders Only on 07/08/2024   Component Date Value Ref Range Status    [facility] Color, U 07/08/2024 Straw   Final    [facility] RBC/HPF, U 07/08/2024 1  0 - 3 #/HPF Final    [facility] Specific Gravity, U 07/08/2024 1.006  1.001 - 1.035 Final    [facility] Nitrite, U 07/08/2024 Negative  Negative Final    [facility] Bilirubin, U 07/08/2024 Negative  Negative Final    [facility] Bacteria, U 07/08/2024 Occasional   Final    [facility] Protein, U 07/08/2024 Negative  Negative Final    [facility] Appearance, U 07/08/2024 Clear   Final    [facility] WBC/HPF, U 07/08/2024 1  0 - 5 #/HPF Final    [facility] Blood, U 07/08/2024 Moderate (A)  Negative Final    [facility] Leukocyte Esterase, U 07/08/2024 Negative  Negative Final    [facility] Ketones, U 07/08/2024 Negative  Negative Final    [facility] Urobilinogen Qual, U 07/08/2024 <2.0 mg/dl  <2.0 mg/dl Final    [facility] Glucose Qual, U 07/08/2024 Negative  Negative Final    [facility] Squamous Epithelial Sarah* 07/08/2024 1  #/HPF Final    [facility] pH, U 07/08/2024 6.0  4.5 - 8.0 Final    [facility] U MICRO 07/08/2024 Indicated   Final    [facility] HGB 07/08/2024 14.7  12.0 - 16.0 g/dL Final     [facility] Instr WBC ND 07/08/2024 10.1   Final    [facility] MCHC 07/08/2024 34.0  32.0 - 37.0 g/dL Final    [facility] MPV 07/08/2024 8.8  7.4 - 10.4 fL Final    [facility] RBC 07/08/2024 5.34  4.20 - 5.40 x10 Final    [facility] MCH 07/08/2024 27.5  27.0 - 34.0 pg Final    [facility] DxH Actions 07/08/2024 See Notes (A)   Final    [facility] MCV 07/08/2024 80.7  80.0 - 100.0 fL Final    [facility] Platelet 07/08/2024 294  150 - 450 x10 Final    [facility] RDW 07/08/2024 15.3 (H)  11.5 - 14.5 % Final    [facility] WBC 07/08/2024 10.1  4.5 - 11.0 x10 Final    [facility] HCT 07/08/2024 43.0  36.0 - 46.0 % Final       Radiology: Reviewed imaging in powerchart.  ECG 12 Lead    Result Date: 7/8/2024  Normal sinus rhythm      No family history on file.  Social History     Socioeconomic History    Marital status:    Tobacco Use    Smoking status: Never    Smokeless tobacco: Never   Substance and Sexual Activity    Alcohol use: Never    Drug use: Never     Past Medical History:   Diagnosis Date    Alveolar proteinosis (Multi)     PAP (pulmonary alveolar proteinosis)    Personal history of other endocrine, nutritional and metabolic disease     History of thyroid disease    Personal history of other medical treatment     History of mammogram     Past Surgical History:   Procedure Laterality Date    BREAST BIOPSY Left     benign    OTHER SURGICAL HISTORY  03/17/2021    Resection    OTHER SURGICAL HISTORY  02/09/2021    Tubal ligation       Charting was completed using voice recognition technology and may include unintended errors.

## 2024-08-07 DIAGNOSIS — K65.1 INTRA-ABDOMINAL ABSCESS (MULTI): Primary | ICD-10-CM

## 2024-08-07 LAB
ANION GAP SERPL CALC-SCNC: 18 MMOL/L (ref 10–20)
BASOPHILS # BLD AUTO: 0.08 X10*3/UL (ref 0–0.1)
BASOPHILS NFR BLD AUTO: 0.5 %
BUN SERPL-MCNC: 9 MG/DL (ref 6–23)
CALCIUM SERPL-MCNC: 9.4 MG/DL (ref 8.6–10.6)
CHLORIDE SERPL-SCNC: 98 MMOL/L (ref 98–107)
CO2 SERPL-SCNC: 21 MMOL/L (ref 21–32)
CREAT SERPL-MCNC: 0.64 MG/DL (ref 0.5–1.05)
EGFRCR SERPLBLD CKD-EPI 2021: >90 ML/MIN/1.73M*2
EOSINOPHIL # BLD AUTO: 0.13 X10*3/UL (ref 0–0.7)
EOSINOPHIL NFR BLD AUTO: 0.8 %
ERYTHROCYTE [DISTWIDTH] IN BLOOD BY AUTOMATED COUNT: 18.6 % (ref 11.5–14.5)
GLUCOSE SERPL-MCNC: 98 MG/DL (ref 74–99)
HCT VFR BLD AUTO: 35.6 % (ref 36–46)
HGB BLD-MCNC: 10.8 G/DL (ref 12–16)
IMM GRANULOCYTES # BLD AUTO: 0.16 X10*3/UL (ref 0–0.7)
IMM GRANULOCYTES NFR BLD AUTO: 1 % (ref 0–0.9)
LYMPHOCYTES # BLD AUTO: 2.24 X10*3/UL (ref 1.2–4.8)
LYMPHOCYTES NFR BLD AUTO: 14.3 %
MCH RBC QN AUTO: 26.2 PG (ref 26–34)
MCHC RBC AUTO-ENTMCNC: 30.3 G/DL (ref 32–36)
MCV RBC AUTO: 86 FL (ref 80–100)
MONOCYTES # BLD AUTO: 1.2 X10*3/UL (ref 0.1–1)
MONOCYTES NFR BLD AUTO: 7.7 %
NEUTROPHILS # BLD AUTO: 11.8 X10*3/UL (ref 1.2–7.7)
NEUTROPHILS NFR BLD AUTO: 75.7 %
NRBC BLD-RTO: 0 /100 WBCS (ref 0–0)
PLATELET # BLD AUTO: 1007 X10*3/UL (ref 150–450)
POTASSIUM SERPL-SCNC: 5 MMOL/L (ref 3.5–5.3)
RBC # BLD AUTO: 4.13 X10*6/UL (ref 4–5.2)
SODIUM SERPL-SCNC: 132 MMOL/L (ref 136–145)
WBC # BLD AUTO: 15.6 X10*3/UL (ref 4.4–11.3)

## 2024-08-08 ENCOUNTER — TELEPHONE (OUTPATIENT)
Dept: PRIMARY CARE | Facility: CLINIC | Age: 49
End: 2024-08-08

## 2024-08-08 DIAGNOSIS — K65.1 INTRA-ABDOMINAL ABSCESS (MULTI): Primary | ICD-10-CM

## 2024-08-08 LAB — PATH REVIEW-CBC DIFFERENTIAL: NORMAL

## 2024-08-12 ENCOUNTER — LAB (OUTPATIENT)
Dept: LAB | Facility: LAB | Age: 49
End: 2024-08-12
Payer: COMMERCIAL

## 2024-08-12 DIAGNOSIS — K65.1 INTRA-ABDOMINAL ABSCESS (MULTI): ICD-10-CM

## 2024-08-12 DIAGNOSIS — K65.1 INTRA-ABDOMINAL ABSCESS (MULTI): Primary | ICD-10-CM

## 2024-08-12 LAB
ALBUMIN SERPL BCP-MCNC: 3.1 G/DL (ref 3.4–5)
ALP SERPL-CCNC: 115 U/L (ref 33–110)
ALT SERPL W P-5'-P-CCNC: 11 U/L (ref 7–45)
ANION GAP SERPL CALC-SCNC: 16 MMOL/L (ref 10–20)
AST SERPL W P-5'-P-CCNC: 14 U/L (ref 9–39)
BASOPHILS # BLD AUTO: 0.09 X10*3/UL (ref 0–0.1)
BASOPHILS NFR BLD AUTO: 0.4 %
BILIRUB SERPL-MCNC: 0.5 MG/DL (ref 0–1.2)
BUN SERPL-MCNC: 10 MG/DL (ref 6–23)
CALCIUM SERPL-MCNC: 8.9 MG/DL (ref 8.6–10.6)
CHLORIDE SERPL-SCNC: 97 MMOL/L (ref 98–107)
CO2 SERPL-SCNC: 24 MMOL/L (ref 21–32)
CREAT SERPL-MCNC: 0.65 MG/DL (ref 0.5–1.05)
CRP SERPL-MCNC: 16.28 MG/DL
EGFRCR SERPLBLD CKD-EPI 2021: >90 ML/MIN/1.73M*2
EOSINOPHIL # BLD AUTO: 0.19 X10*3/UL (ref 0–0.7)
EOSINOPHIL NFR BLD AUTO: 0.9 %
ERYTHROCYTE [DISTWIDTH] IN BLOOD BY AUTOMATED COUNT: 18.5 % (ref 11.5–14.5)
GLUCOSE SERPL-MCNC: 131 MG/DL (ref 74–99)
HCT VFR BLD AUTO: 33.8 % (ref 36–46)
HGB BLD-MCNC: 10.3 G/DL (ref 12–16)
IMM GRANULOCYTES # BLD AUTO: 0.21 X10*3/UL (ref 0–0.7)
IMM GRANULOCYTES NFR BLD AUTO: 1 % (ref 0–0.9)
LYMPHOCYTES # BLD AUTO: 2.23 X10*3/UL (ref 1.2–4.8)
LYMPHOCYTES NFR BLD AUTO: 10.7 %
MCH RBC QN AUTO: 25.9 PG (ref 26–34)
MCHC RBC AUTO-ENTMCNC: 30.5 G/DL (ref 32–36)
MCV RBC AUTO: 85 FL (ref 80–100)
MONOCYTES # BLD AUTO: 1.34 X10*3/UL (ref 0.1–1)
MONOCYTES NFR BLD AUTO: 6.4 %
NEUTROPHILS # BLD AUTO: 16.87 X10*3/UL (ref 1.2–7.7)
NEUTROPHILS NFR BLD AUTO: 80.6 %
NRBC BLD-RTO: 0 /100 WBCS (ref 0–0)
PLATELET # BLD AUTO: 866 X10*3/UL (ref 150–450)
POTASSIUM SERPL-SCNC: 4.9 MMOL/L (ref 3.5–5.3)
PROT SERPL-MCNC: 7.1 G/DL (ref 6.4–8.2)
RBC # BLD AUTO: 3.98 X10*6/UL (ref 4–5.2)
SODIUM SERPL-SCNC: 132 MMOL/L (ref 136–145)
WBC # BLD AUTO: 20.9 X10*3/UL (ref 4.4–11.3)

## 2024-08-12 PROCEDURE — 85025 COMPLETE CBC W/AUTO DIFF WBC: CPT

## 2024-08-12 PROCEDURE — 36415 COLL VENOUS BLD VENIPUNCTURE: CPT

## 2024-08-12 PROCEDURE — 80053 COMPREHEN METABOLIC PANEL: CPT

## 2024-08-12 PROCEDURE — 86140 C-REACTIVE PROTEIN: CPT

## 2024-08-22 DIAGNOSIS — K65.1 INTRA-ABDOMINAL ABSCESS (MULTI): Primary | ICD-10-CM

## 2024-08-22 LAB
NON-UH HIE BASO COUNT: 0.07 X1000 (ref 0–0.2)
NON-UH HIE BASOS %: 0.5 %
NON-UH HIE BUN/CREAT RATIO: 14.3
NON-UH HIE BUN: 10 MG/DL (ref 9–23)
NON-UH HIE C-REACTIVE PROTEIN, QUANTITATIVE: 3.7 MG/DL (ref 0–0.9)
NON-UH HIE CALCIUM: 9.7 MG/DL (ref 8.7–10.4)
NON-UH HIE CALCULATED OSMOLALITY: 266 MOSM/KG (ref 275–295)
NON-UH HIE CHLORIDE: 101 MMOL/L (ref 98–107)
NON-UH HIE CO2, VENOUS: 24 MMOL/L (ref 20–31)
NON-UH HIE CREATININE: 0.7 MG/DL (ref 0.5–0.8)
NON-UH HIE DIFF?: NO
NON-UH HIE DXH ACTIONS: ABNORMAL
NON-UH HIE EOS COUNT: 0.16 X1000 (ref 0–0.5)
NON-UH HIE EOSIN %: 1.2 %
NON-UH HIE GFR AA: >60
NON-UH HIE GLOMERULAR FILTRATION RATE: >60 ML/MIN/1.73M?
NON-UH HIE GLUCOSE: 83 MG/DL (ref 74–106)
NON-UH HIE HCT: 37.3 % (ref 36–46)
NON-UH HIE HGB: 12 G/DL (ref 12–16)
NON-UH HIE INSTR WBC: 13.4
NON-UH HIE K: 5 MMOL/L (ref 3.5–5.1)
NON-UH HIE LYMPH %: 18 %
NON-UH HIE LYMPH COUNT: 2.42 X1000 (ref 1.2–4.8)
NON-UH HIE MCH: 25.8 PG (ref 27–34)
NON-UH HIE MCHC: 32.1 G/DL (ref 32–37)
NON-UH HIE MCV: 80.3 FL (ref 80–100)
NON-UH HIE MONO %: 6.3 %
NON-UH HIE MONO COUNT: 0.85 X1000 (ref 0.1–1)
NON-UH HIE MPV: 7.1 FL (ref 7.4–10.4)
NON-UH HIE NA: 134 MMOL/L (ref 135–145)
NON-UH HIE NEUTROPHIL %: 74 %
NON-UH HIE NEUTROPHIL COUNT (ANC): 9.92 X1000 (ref 1.4–8.8)
NON-UH HIE NUCLEATED RBC: 0 /100WBC
NON-UH HIE PLATELET: 589 X10 (ref 150–450)
NON-UH HIE RBC: 4.64 X10 (ref 4.2–5.4)
NON-UH HIE RDW: 19.5 % (ref 11.5–14.5)
NON-UH HIE RED BLOOD CELL MORPHOLOGY: ABNORMAL
NON-UH HIE SCAN DIFFERENTIAL: ABNORMAL
NON-UH HIE WBC: 13.4 X10 (ref 4.5–11)

## 2024-08-26 LAB
NON-UH HIE ABSOLUTE BAND CT: 0.44 X1000 (ref 0–0.7)
NON-UH HIE ABSOLUTE LYMPH CT: 1.63 X1000 (ref 1.2–4.8)
NON-UH HIE ABSOLUTE MONO CT: 1.18 X1000 (ref 0.1–1)
NON-UH HIE ABSOLUTE NEUTROPHIL CT: 11.98 X1000 (ref 1.4–8.8)
NON-UH HIE ABSOLUTE SEG CT: 11.54 X1000 (ref 1.4–8.8)
NON-UH HIE ANISOCYTOSIS: ABNORMAL
NON-UH HIE BAND %: 3 %
NON-UH HIE BUN/CREAT RATIO: 11.4
NON-UH HIE BUN: 8 MG/DL (ref 9–23)
NON-UH HIE C-REACTIVE PROTEIN, QUANTITATIVE: 4.5 MG/DL (ref 0–0.9)
NON-UH HIE CALCIUM: 9.3 MG/DL (ref 8.7–10.4)
NON-UH HIE CALCULATED OSMOLALITY: 270 MOSM/KG (ref 275–295)
NON-UH HIE CHLORIDE: 103 MMOL/L (ref 98–107)
NON-UH HIE CO2, VENOUS: 26 MMOL/L (ref 20–31)
NON-UH HIE CREATININE: 0.7 MG/DL (ref 0.5–0.8)
NON-UH HIE DIFF?: YES
NON-UH HIE DXH ACTIONS: ABNORMAL
NON-UH HIE GFR AA: >60
NON-UH HIE GLOMERULAR FILTRATION RATE: >60 ML/MIN/1.73M?
NON-UH HIE GLUCOSE: 86 MG/DL (ref 74–106)
NON-UH HIE HCT: 37.1 % (ref 36–46)
NON-UH HIE HGB: 11.9 G/DL (ref 12–16)
NON-UH HIE INSTR WBC: 14.8
NON-UH HIE K: 4.5 MMOL/L (ref 3.5–5.1)
NON-UH HIE LARGE PLATELETS: PRESENT
NON-UH HIE LYMPHOCYTE %: 11 %
NON-UH HIE MCH: 25.9 PG (ref 27–34)
NON-UH HIE MCHC: 32.1 G/DL (ref 32–37)
NON-UH HIE MCV: 80.4 FL (ref 80–100)
NON-UH HIE MONOCYTE %: 8 %
NON-UH HIE MPV: 7.3 FL (ref 7.4–10.4)
NON-UH HIE NA: 136 MMOL/L (ref 135–145)
NON-UH HIE PLATELET: 502 X10 (ref 150–450)
NON-UH HIE POLYCHROMASIA: SLIGHT
NON-UH HIE PROCALCITONIN: 0.05 NG/ML
NON-UH HIE RBC: 4.61 X10 (ref 4.2–5.4)
NON-UH HIE RDW: 19.6 % (ref 11.5–14.5)
NON-UH HIE SEGMENTED NEUT %: 78 %
NON-UH HIE WBC: 14.8 X10 (ref 4.5–11)

## 2024-08-28 DIAGNOSIS — K65.1 INTRA-ABDOMINAL ABSCESS (MULTI): Primary | ICD-10-CM

## 2024-08-28 RX ORDER — METRONIDAZOLE 500 MG/1
500 TABLET ORAL 3 TIMES DAILY
Qty: 21 TABLET | Refills: 0 | Status: SHIPPED | OUTPATIENT
Start: 2024-08-28 | End: 2024-09-04

## 2024-08-28 RX ORDER — AMOXICILLIN AND CLAVULANATE POTASSIUM 875; 125 MG/1; MG/1
875 TABLET, FILM COATED ORAL 2 TIMES DAILY
Qty: 14 TABLET | Refills: 0 | Status: SHIPPED | OUTPATIENT
Start: 2024-08-28 | End: 2024-09-04

## 2024-08-29 ENCOUNTER — OFFICE VISIT (OUTPATIENT)
Dept: PRIMARY CARE | Facility: CLINIC | Age: 49
End: 2024-08-29
Payer: COMMERCIAL

## 2024-08-29 VITALS
HEART RATE: 111 BPM | SYSTOLIC BLOOD PRESSURE: 120 MMHG | WEIGHT: 187 LBS | BODY MASS INDEX: 31.16 KG/M2 | TEMPERATURE: 97.2 F | OXYGEN SATURATION: 99 % | HEIGHT: 65 IN | DIASTOLIC BLOOD PRESSURE: 84 MMHG

## 2024-08-29 DIAGNOSIS — K65.1 INTRA-ABDOMINAL ABSCESS (MULTI): Primary | ICD-10-CM

## 2024-08-29 DIAGNOSIS — R05.1 ACUTE COUGH: ICD-10-CM

## 2024-08-29 LAB
NON-UH HIE BASO COUNT: 0.09 X1000 (ref 0–0.2)
NON-UH HIE BASOS %: 0.7 %
NON-UH HIE BUN/CREAT RATIO: 10
NON-UH HIE BUN: 8 MG/DL (ref 9–23)
NON-UH HIE C-REACTIVE PROTEIN, QUANTITATIVE: 2.4 MG/DL (ref 0–0.9)
NON-UH HIE CALCIUM: 9.5 MG/DL (ref 8.7–10.4)
NON-UH HIE CALCULATED OSMOLALITY: 271 MOSM/KG (ref 275–295)
NON-UH HIE CHLORIDE: 102 MMOL/L (ref 98–107)
NON-UH HIE CO2, VENOUS: 28 MMOL/L (ref 20–31)
NON-UH HIE CREATININE: 0.8 MG/DL (ref 0.5–0.8)
NON-UH HIE DIFF?: NO
NON-UH HIE DXH ACTIONS: ABNORMAL
NON-UH HIE EOS COUNT: 0.08 X1000 (ref 0–0.5)
NON-UH HIE EOSIN %: 0.6 %
NON-UH HIE GFR AA: >60
NON-UH HIE GLOMERULAR FILTRATION RATE: >60 ML/MIN/1.73M?
NON-UH HIE GLUCOSE: 83 MG/DL (ref 74–106)
NON-UH HIE HCT: 38.5 % (ref 36–46)
NON-UH HIE HGB: 12.3 G/DL (ref 12–16)
NON-UH HIE INSTR WBC: 12.8
NON-UH HIE K: 4.4 MMOL/L (ref 3.5–5.1)
NON-UH HIE LYMPH %: 15.7 %
NON-UH HIE LYMPH COUNT: 2.01 X1000 (ref 1.2–4.8)
NON-UH HIE MCH: 25.6 PG (ref 27–34)
NON-UH HIE MCHC: 31.9 G/DL (ref 32–37)
NON-UH HIE MCV: 80.4 FL (ref 80–100)
NON-UH HIE MONO %: 6.2 %
NON-UH HIE MONO COUNT: 0.8 X1000 (ref 0.1–1)
NON-UH HIE MPV: 7.4 FL (ref 7.4–10.4)
NON-UH HIE NA: 137 MMOL/L (ref 135–145)
NON-UH HIE NEUTROPHIL %: 76.8 %
NON-UH HIE NEUTROPHIL COUNT (ANC): 9.83 X1000 (ref 1.4–8.8)
NON-UH HIE NUCLEATED RBC: 0 /100WBC
NON-UH HIE PLATELET: 563 X10 (ref 150–450)
NON-UH HIE PROCALCITONIN: 0.06 NG/ML
NON-UH HIE RBC: 4.78 X10 (ref 4.2–5.4)
NON-UH HIE RDW: 19.6 % (ref 11.5–14.5)
NON-UH HIE RED BLOOD CELL MORPHOLOGY: ABNORMAL
NON-UH HIE SCAN DIFFERENTIAL: ABNORMAL
NON-UH HIE STAT OP CALL BACK: NORMAL
NON-UH HIE WBC: 12.8 X10 (ref 4.5–11)

## 2024-08-29 PROCEDURE — 1036F TOBACCO NON-USER: CPT | Performed by: FAMILY MEDICINE

## 2024-08-29 PROCEDURE — 3008F BODY MASS INDEX DOCD: CPT | Performed by: FAMILY MEDICINE

## 2024-08-29 PROCEDURE — 99214 OFFICE O/P EST MOD 30 MIN: CPT | Performed by: FAMILY MEDICINE

## 2024-08-29 RX ORDER — FLUTICASONE PROPIONATE 50 MCG
1 SPRAY, SUSPENSION (ML) NASAL DAILY
Qty: 16 G | Refills: 1 | Status: SHIPPED | OUTPATIENT
Start: 2024-08-29 | End: 2025-08-29

## 2024-08-29 RX ORDER — BROMPHENIRAMINE MALEATE, PSEUDOEPHEDRINE HYDROCHLORIDE, AND DEXTROMETHORPHAN HYDROBROMIDE 2; 30; 10 MG/5ML; MG/5ML; MG/5ML
5 SYRUP ORAL 4 TIMES DAILY PRN
Qty: 120 ML | Refills: 0 | Status: SHIPPED | OUTPATIENT
Start: 2024-08-29 | End: 2024-09-08

## 2024-08-29 RX ORDER — IPRATROPIUM BROMIDE 21 UG/1
2 SPRAY, METERED NASAL EVERY 12 HOURS
Qty: 30 ML | Refills: 2 | Status: SHIPPED | OUTPATIENT
Start: 2024-08-29 | End: 2024-09-28

## 2024-08-29 RX ORDER — L.ACID/L.CASEI/B.BIF/B.LON/FOS 2B CELL-50
1 CAPSULE ORAL
COMMUNITY
Start: 2024-08-20

## 2024-08-29 NOTE — PROGRESS NOTES
"Subjective   Patient ID: Juliana Maldonado is a 49 y.o. female who presents for Follow-up.    Assessment/Plan     Problem List Items Addressed This Visit    None      HPI    Patient was admitted with abdominal pain found to have intra-abdominal abscess status post CT-guided drainage  Drain was recently removed  Currently on Augmentin and Flagyl  Bacteroid fragilis      Lab work today with persistent cough will get CT chest without contrast  Consider CT abdomen pelvis without contrast    Recently had a hysterectomy with bilateral salpingo-oophorectomy in July 2024    Anemia received IV iron        Patient denies any fever chills abdominal pain much better    Allergies   Allergen Reactions    Oxycodone Other     too strong       Current Outpatient Medications   Medication Sig Dispense Refill    amoxicillin-pot clavulanate (Augmentin) 875-125 mg tablet Take 1 tablet (875 mg) by mouth 2 times a day for 7 days. 14 tablet 0    cholecalciferol (Vitamin D-3) 25 MCG (1000 UT) tablet Take by mouth.      docusate sodium (Colace) 100 mg capsule 1 capsule (100 mg).      levothyroxine (Synthroid) 112 mcg tablet Take 1 tablet (112 mcg) by mouth early in the morning.. Take on an empty stomach at the same time each day, either 30 to 60 minutes prior to breakfast 90 tablet 3    metroNIDAZOLE (Flagyl) 500 mg tablet Take 1 tablet (500 mg) by mouth 3 times a day for 7 days. 21 tablet 0    Probiotic Blend 2 billion cell-50 mg capsule Take 1 capsule by mouth early in the morning..       No current facility-administered medications for this visit.       Objective   Visit Vitals  /84 (BP Location: Left arm, Patient Position: Sitting)   Pulse (!) 111   Temp 36.2 °C (97.2 °F)   Ht 1.651 m (5' 5\")   Wt 84.8 kg (187 lb)   SpO2 99%   BMI 31.12 kg/m²   OB Status Perimenopausal   Smoking Status Never   BSA 1.97 m²     Physical Exam  Constitutional:       General: He is not in acute distress.     Appearance: Normal appearance.   HENT:     "  Head: Normocephalic and atraumatic.      Nose: Nose normal.   Eyes:      Extraocular Movements: Extraocular movements intact.      Conjunctiva/sclera: Conjunctivae normal.   Cardiovascular:      Rate and Rhythm: Normal rate and regular rhythm.   Pulmonary:      Effort: Pulmonary effort is normal.      Breath sounds: Normal breath sounds.   Skin:     General: Skin is warm.   Neurological:      Mental Status: He is alert and oriented to person, place, and time.   Psychiatric:         Mood and Affect: Mood normal.         Behavior: Behavior normal.     Abdomen positive bowel sounds soft nontender nondistended induration present around 4 to 5 cm above umbilicus with minimal redness  Immunization History   Administered Date(s) Administered    Flu vaccine (IIV4), preservative free *Check age/dose* 10/25/2016, 09/14/2017, 11/14/2019, 09/15/2020    Flu vaccine, quadrivalent, no egg protein, age 6 month or greater (FLUCELVAX) 11/14/2019, 10/08/2023    Flu vaccine, quadrivalent, recombinant, preservative free, adult (FLUBLOK) 09/28/2021, 09/19/2022    Moderna COVID-19 vaccine, Fall 2023, 12 yeasrs and older (50mcg/0.5mL) 10/08/2023    Moderna SARS-CoV-2 Vaccination 04/09/2021, 05/07/2021, 11/15/2021, 07/26/2022    Pfizer COVID-19 vaccine, bivalent, age 12 years and older (30 mcg/0.3 mL) 12/01/2022       Review of Systems    Orders Only on 08/26/2024   Component Date Value Ref Range Status    [facility] HCT 08/26/2024 37.1  36.0 - 46.0 % Final    [facility] RBC 08/26/2024 4.61  4.20 - 5.40 x10 Final    [facility] Platelet 08/26/2024 502 (H)  150 - 450 x10 Final    [facility] MCHC 08/26/2024 32.1  32.0 - 37.0 g/dL Final    [facility] Instr WBC 08/26/2024 14.8   Final    [facility] DIFF? 08/26/2024 Yes   Final    [facility] MCV 08/26/2024 80.4  80.0 - 100.0 fL Final    [facility] MPV 08/26/2024 7.3 (L)  7.4 - 10.4 fL Final    [facility] HGB 08/26/2024 11.9 (L)  12.0 - 16.0 g/dL Final    [facility] RDW 08/26/2024 19.6 (H)  11.5  - 14.5 % Final    [facility] WBC 08/26/2024 14.8 (H)  4.5 - 11.0 x10 Final    [facility] DxH Actions 08/26/2024 See Notes (A)   Final    [facility] MCH 08/26/2024 25.9 (L)  27.0 - 34.0 pg Final    [facility] K 08/26/2024 4.5  3.5 - 5.1 mmol/L Final    [facility] Chloride 08/26/2024 103  98 - 107 mmol/L Final    [facility] Na 08/26/2024 136  135 - 145 mmol/L Final    [facility] Glucose 08/26/2024 86  74 - 106 mg/dL Final    [facility] CO2, venous 08/26/2024 26.0  20.0 - 31.0 mmol/L Final    [facility] GFR AA 08/26/2024 >60   Final    [facility] Glomerular Filtration R* 08/26/2024 >60  mL/min/1.73m? Final    [facility] BUN/Creat Ratio 08/26/2024 11.4   Final    [facility] Calcium 08/26/2024 9.3  8.7 - 10.4 mg/dL Final    [facility] Creatinine 08/26/2024 0.7  0.5 - 0.8 mg/dL Final    [facility] BUN 08/26/2024 8 (L)  9 - 23 mg/dL Final    [facility] Calculated Osmolality 08/26/2024 270 (L)  275 - 295 mOsm/kg Final    [facility] C-Reactive Protein, Bogdan* 08/26/2024 4.5 (H)  0.0 - 0.9 mg/dL Final    [facility] Absolute Neutrophil Ct 08/26/2024 11.98 (H)  1.40 - 8.80 x1000 Final    [facility] Lymphocyte % 08/26/2024 11  % Final    [facility] Segmented Neut % 08/26/2024 78  % Final    [facility] Absolute Seg Ct 08/26/2024 11.54 (H)  1.40 - 8.80 x1000 Final    [facility] Polychromasia 08/26/2024 Slight   Final    [facility] Absolute Lymph Ct 08/26/2024 1.63  1.20 - 4.80 x1000 Final    [facility] Monocyte % 08/26/2024 8  % Final    [facility] Band % 08/26/2024 3  % Final    [facility] Absolute Band Ct 08/26/2024 0.44  0.00 - 0.70 x1000 Final    [facility] Large Platelets 08/26/2024 Present   Final    [facility] Absolute [place] [facility] 08/26/2024 1.18 (H)  0.10 - 1.00 x1000 Final    [facility] Anisocytosis 08/26/2024 Moderate   Final    [facility] Procalcitonin 08/26/2024 0.05  ng/mL Final   Orders Only on 08/22/2024   Component Date Value Ref Range Status    [facility] Chloride 08/22/2024 101  98 - 107 mmol/L Final    [facility]  Glomerular Filtration R* 08/22/2024 >60  mL/min/1.73m? Final    [facility] BUN/Creat Ratio 08/22/2024 14.3   Final    [facility] Na 08/22/2024 134 (L)  135 - 145 mmol/L Final    [facility] Creatinine 08/22/2024 0.7  0.5 - 0.8 mg/dL Final    [facility] GFR AA 08/22/2024 >60   Final    [facility] CO2, venous 08/22/2024 24.0  20.0 - 31.0 mmol/L Final    [facility] K 08/22/2024 5.0  3.5 - 5.1 mmol/L Final    [facility] Calculated Osmolality 08/22/2024 266 (L)  275 - 295 mOsm/kg Final    [facility] Calcium 08/22/2024 9.7  8.7 - 10.4 mg/dL Final    [facility] BUN 08/22/2024 10  9 - 23 mg/dL Final    [facility] Glucose 08/22/2024 83  74 - 106 mg/dL Final    [facility] C-Reactive Protein, Bogdan* 08/22/2024 3.7 (H)  0.0 - 0.9 mg/dL Final    [facility] MCV 08/22/2024 80.3  80.0 - 100.0 fL Final    [facility] MPV 08/22/2024 7.1 (L)  7.4 - 10.4 fL Final    [facility] DIFF? 08/22/2024 No   Final    [facility] RDW 08/22/2024 19.5 (H)  11.5 - 14.5 % Final    [facility] RBC 08/22/2024 4.64  4.20 - 5.40 x10 Final    [facility] MCH 08/22/2024 25.8 (L)  27.0 - 34.0 pg Final    [facility] WBC 08/22/2024 13.4 (H)  4.5 - 11.0 x10 Final    [facility] Instr WBC 08/22/2024 13.4   Final    [facility] HCT 08/22/2024 37.3  36.0 - 46.0 % Final    [facility] Platelet 08/22/2024 589 (H)  150 - 450 x10 Final    [facility] DxH Actions 08/22/2024 See Notes (A)   Final    [facility] HGB 08/22/2024 12.0  12.0 - 16.0 g/dL Final    [facility] Nucleated RBC 08/22/2024 0  /100WBC Final    [facility] MCHC 08/22/2024 32.1  32.0 - 37.0 g/dL Final    [facility] Mono % 08/22/2024 6.3  % Final    [facility] Baso Count 08/22/2024 0.07  0.00 - 0.20 x1000 Final    [facility] Neutrophil % 08/22/2024 74.0  % Final    [facility] Mono Count 08/22/2024 0.85  0.10 - 1.00 x1000 Final    [facility] Neutrophil Count (ANC) 08/22/2024 9.92 (H)  1.40 - 8.80 x1000 Final    [facility] Eosin % 08/22/2024 1.2  % Final    [facility] Red Blood Cell Morpholo* 08/22/2024 See Notes (A)    Final    [facility] Lymph % 08/22/2024 18.0  % Final    [facility] Eos Count 08/22/2024 0.16  0.00 - 0.50 x1000 Final    [facility] Lymph Count 08/22/2024 2.42  1.20 - 4.80 x1000 Final    [facility] Scan Differential 08/22/2024 Diff Scd   Final    [facility] Basos % 08/22/2024 0.5  % Final   Lab on 08/12/2024   Component Date Value Ref Range Status    WBC 08/12/2024 20.9 (H)  4.4 - 11.3 x10*3/uL Final    nRBC 08/12/2024 0.0  0.0 - 0.0 /100 WBCs Final    RBC 08/12/2024 3.98 (L)  4.00 - 5.20 x10*6/uL Final    Hemoglobin 08/12/2024 10.3 (L)  12.0 - 16.0 g/dL Final    Hematocrit 08/12/2024 33.8 (L)  36.0 - 46.0 % Final    MCV 08/12/2024 85  80 - 100 fL Final    MCH 08/12/2024 25.9 (L)  26.0 - 34.0 pg Final    MCHC 08/12/2024 30.5 (L)  32.0 - 36.0 g/dL Final    RDW 08/12/2024 18.5 (H)  11.5 - 14.5 % Final    Platelets 08/12/2024 866 (H)  150 - 450 x10*3/uL Final    Neutrophils % 08/12/2024 80.6  40.0 - 80.0 % Final    Immature Granulocytes %, Automated 08/12/2024 1.0 (H)  0.0 - 0.9 % Final    Lymphocytes % 08/12/2024 10.7  13.0 - 44.0 % Final    Monocytes % 08/12/2024 6.4  2.0 - 10.0 % Final    Eosinophils % 08/12/2024 0.9  0.0 - 6.0 % Final    Basophils % 08/12/2024 0.4  0.0 - 2.0 % Final    Neutrophils Absolute 08/12/2024 16.87 (H)  1.20 - 7.70 x10*3/uL Final    Immature Granulocytes Absolute, Au* 08/12/2024 0.21  0.00 - 0.70 x10*3/uL Final    Lymphocytes Absolute 08/12/2024 2.23  1.20 - 4.80 x10*3/uL Final    Monocytes Absolute 08/12/2024 1.34 (H)  0.10 - 1.00 x10*3/uL Final    Eosinophils Absolute 08/12/2024 0.19  0.00 - 0.70 x10*3/uL Final    Basophils Absolute 08/12/2024 0.09  0.00 - 0.10 x10*3/uL Final    Glucose 08/12/2024 131 (H)  74 - 99 mg/dL Final    Sodium 08/12/2024 132 (L)  136 - 145 mmol/L Final    Potassium 08/12/2024 4.9  3.5 - 5.3 mmol/L Final    Chloride 08/12/2024 97 (L)  98 - 107 mmol/L Final    Bicarbonate 08/12/2024 24  21 - 32 mmol/L Final    Anion Gap 08/12/2024 16  10 - 20 mmol/L Final    Urea  Nitrogen 08/12/2024 10  6 - 23 mg/dL Final    Creatinine 08/12/2024 0.65  0.50 - 1.05 mg/dL Final    eGFR 08/12/2024 >90  >60 mL/min/1.73m*2 Final    Calcium 08/12/2024 8.9  8.6 - 10.6 mg/dL Final    Albumin 08/12/2024 3.1 (L)  3.4 - 5.0 g/dL Final    Alkaline Phosphatase 08/12/2024 115 (H)  33 - 110 U/L Final    Total Protein 08/12/2024 7.1  6.4 - 8.2 g/dL Final    AST 08/12/2024 14  9 - 39 U/L Final    Bilirubin, Total 08/12/2024 0.5  0.0 - 1.2 mg/dL Final    ALT 08/12/2024 11  7 - 45 U/L Final    C-Reactive Protein 08/12/2024 16.28 (H)  <1.00 mg/dL Final   Lab on 08/06/2024   Component Date Value Ref Range Status    WBC 08/06/2024 15.6 (H)  4.4 - 11.3 x10*3/uL Final    nRBC 08/06/2024 0.0  0.0 - 0.0 /100 WBCs Final    RBC 08/06/2024 4.13  4.00 - 5.20 x10*6/uL Final    Hemoglobin 08/06/2024 10.8 (L)  12.0 - 16.0 g/dL Final    Hematocrit 08/06/2024 35.6 (L)  36.0 - 46.0 % Final    MCV 08/06/2024 86  80 - 100 fL Final    MCH 08/06/2024 26.2  26.0 - 34.0 pg Final    MCHC 08/06/2024 30.3 (L)  32.0 - 36.0 g/dL Final    RDW 08/06/2024 18.6 (H)  11.5 - 14.5 % Final    Platelets 08/06/2024 1,007 (H)  150 - 450 x10*3/uL Final    Neutrophils % 08/06/2024 75.7  40.0 - 80.0 % Final    Immature Granulocytes %, Automated 08/06/2024 1.0 (H)  0.0 - 0.9 % Final    Lymphocytes % 08/06/2024 14.3  13.0 - 44.0 % Final    Monocytes % 08/06/2024 7.7  2.0 - 10.0 % Final    Eosinophils % 08/06/2024 0.8  0.0 - 6.0 % Final    Basophils % 08/06/2024 0.5  0.0 - 2.0 % Final    Neutrophils Absolute 08/06/2024 11.80 (H)  1.20 - 7.70 x10*3/uL Final    Immature Granulocytes Absolute, Au* 08/06/2024 0.16  0.00 - 0.70 x10*3/uL Final    Lymphocytes Absolute 08/06/2024 2.24  1.20 - 4.80 x10*3/uL Final    Monocytes Absolute 08/06/2024 1.20 (H)  0.10 - 1.00 x10*3/uL Final    Eosinophils Absolute 08/06/2024 0.13  0.00 - 0.70 x10*3/uL Final    Basophils Absolute 08/06/2024 0.08  0.00 - 0.10 x10*3/uL Final    Glucose 08/06/2024 98  74 - 99 mg/dL Final     Sodium 08/06/2024 132 (L)  136 - 145 mmol/L Final    Potassium 08/06/2024 5.0  3.5 - 5.3 mmol/L Final    Chloride 08/06/2024 98  98 - 107 mmol/L Final    Bicarbonate 08/06/2024 21  21 - 32 mmol/L Final    Anion Gap 08/06/2024 18  10 - 20 mmol/L Final    Urea Nitrogen 08/06/2024 9  6 - 23 mg/dL Final    Creatinine 08/06/2024 0.64  0.50 - 1.05 mg/dL Final    eGFR 08/06/2024 >90  >60 mL/min/1.73m*2 Final    Calcium 08/06/2024 9.4  8.6 - 10.6 mg/dL Final    C-Reactive Protein 08/06/2024 18.55 (H)  <1.00 mg/dL Final    Thyroid Stimulating Hormone 08/06/2024 1.62  0.44 - 3.98 mIU/L Final    Pathologist Review-CBC Differential 08/06/2024 Mild neutrophilia with left shift. No basophilia.  Marked thrombocytosis, favor reactive.   Final   Orders Only on 07/08/2024   Component Date Value Ref Range Status    [facility] Color, U 07/08/2024 Straw   Final    [facility] RBC/HPF, U 07/08/2024 1  0 - 3 #/HPF Final    [facility] Specific Gravity, U 07/08/2024 1.006  1.001 - 1.035 Final    [facility] Nitrite, U 07/08/2024 Negative  Negative Final    [facility] Bilirubin, U 07/08/2024 Negative  Negative Final    [facility] Bacteria, U 07/08/2024 Occasional   Final    [facility] Protein, U 07/08/2024 Negative  Negative Final    [facility] Appearance, U 07/08/2024 Clear   Final    [facility] WBC/HPF, U 07/08/2024 1  0 - 5 #/HPF Final    [facility] Blood, U 07/08/2024 Moderate (A)  Negative Final    [facility] Leukocyte Esterase, U 07/08/2024 Negative  Negative Final    [facility] Ketones, U 07/08/2024 Negative  Negative Final    [facility] Urobilinogen Qual, U 07/08/2024 <2.0 mg/dl  <2.0 mg/dl Final    [facility] Glucose Qual, U 07/08/2024 Negative  Negative Final    [facility] Squamous Epithelial Sarah* 07/08/2024 1  #/HPF Final    [facility] pH, U 07/08/2024 6.0  4.5 - 8.0 Final    [facility] U MICRO 07/08/2024 Indicated   Final    [facility] HGB 07/08/2024 14.7  12.0 - 16.0 g/dL Final    [facility] Instr WBC ND 07/08/2024 10.1   Final     [facility] MCHC 07/08/2024 34.0  32.0 - 37.0 g/dL Final    [facility] MPV 07/08/2024 8.8  7.4 - 10.4 fL Final    [facility] RBC 07/08/2024 5.34  4.20 - 5.40 x10 Final    [facility] MCH 07/08/2024 27.5  27.0 - 34.0 pg Final    [facility] DxH Actions 07/08/2024 See Notes (A)   Final    [facility] MCV 07/08/2024 80.7  80.0 - 100.0 fL Final    [facility] Platelet 07/08/2024 294  150 - 450 x10 Final    [facility] RDW 07/08/2024 15.3 (H)  11.5 - 14.5 % Final    [facility] WBC 07/08/2024 10.1  4.5 - 11.0 x10 Final    [facility] HCT 07/08/2024 43.0  36.0 - 46.0 % Final       Radiology: Reviewed imaging in powerchart.  ECG 12 Lead    Result Date: 7/8/2024  Normal sinus rhythm      No family history on file.  Social History     Socioeconomic History    Marital status:    Tobacco Use    Smoking status: Never    Smokeless tobacco: Never   Substance and Sexual Activity    Alcohol use: Never    Drug use: Never     Past Medical History:   Diagnosis Date    Alveolar proteinosis (Multi)     PAP (pulmonary alveolar proteinosis)    Personal history of other endocrine, nutritional and metabolic disease     History of thyroid disease    Personal history of other medical treatment     History of mammogram     Past Surgical History:   Procedure Laterality Date    BREAST BIOPSY Left     benign    OTHER SURGICAL HISTORY  03/17/2021    Resection    OTHER SURGICAL HISTORY  02/09/2021    Tubal ligation       Charting was completed using voice recognition technology and may include unintended errors.

## 2024-09-04 DIAGNOSIS — K65.1 INTRA-ABDOMINAL ABSCESS (MULTI): ICD-10-CM

## 2024-09-05 LAB
NON-UH HIE BASO COUNT: 0.07 X1000 (ref 0–0.2)
NON-UH HIE BASOS %: 0.6 %
NON-UH HIE BUN/CREAT RATIO: 11.2
NON-UH HIE BUN: 9 MG/DL (ref 9–23)
NON-UH HIE C-REACTIVE PROTEIN, QUANTITATIVE: 1.7 MG/DL (ref 0–0.9)
NON-UH HIE CALCIUM: 9.5 MG/DL (ref 8.7–10.4)
NON-UH HIE CALCULATED OSMOLALITY: 274 MOSM/KG (ref 275–295)
NON-UH HIE CHLORIDE: 102 MMOL/L (ref 98–107)
NON-UH HIE CO2, VENOUS: 27 MMOL/L (ref 20–31)
NON-UH HIE CREATININE: 0.8 MG/DL (ref 0.5–0.8)
NON-UH HIE DIFF?: NO
NON-UH HIE DXH ACTIONS: ABNORMAL
NON-UH HIE EOS COUNT: 0.22 X1000 (ref 0–0.5)
NON-UH HIE EOSIN %: 1.9 %
NON-UH HIE GFR AA: >60
NON-UH HIE GLOMERULAR FILTRATION RATE: >60 ML/MIN/1.73M?
NON-UH HIE GLUCOSE: 98 MG/DL (ref 74–106)
NON-UH HIE HCT: 41.3 % (ref 36–46)
NON-UH HIE HGB: 13.4 G/DL (ref 12–16)
NON-UH HIE INSTR WBC: 11.6
NON-UH HIE K: 4.6 MMOL/L (ref 3.5–5.1)
NON-UH HIE LYMPH %: 26 %
NON-UH HIE LYMPH COUNT: 3.03 X1000 (ref 1.2–4.8)
NON-UH HIE MCH: 26 PG (ref 27–34)
NON-UH HIE MCHC: 32.4 G/DL (ref 32–37)
NON-UH HIE MCV: 80.2 FL (ref 80–100)
NON-UH HIE MONO %: 8.2 %
NON-UH HIE MONO COUNT: 0.95 X1000 (ref 0.1–1)
NON-UH HIE MPV: 7.5 FL (ref 7.4–10.4)
NON-UH HIE NA: 138 MMOL/L (ref 135–145)
NON-UH HIE NEUTROPHIL %: 63.2 %
NON-UH HIE NEUTROPHIL COUNT (ANC): 7.36 X1000 (ref 1.4–8.8)
NON-UH HIE NUCLEATED RBC: 0 /100WBC
NON-UH HIE PLATELET: 522 X10 (ref 150–450)
NON-UH HIE PROCALCITONIN: <0.05 NG/ML
NON-UH HIE RBC: 5.15 X10 (ref 4.2–5.4)
NON-UH HIE RDW: 19.8 % (ref 11.5–14.5)
NON-UH HIE RED BLOOD CELL MORPHOLOGY: ABNORMAL
NON-UH HIE WBC: 11.6 X10 (ref 4.5–11)

## 2024-12-10 ENCOUNTER — APPOINTMENT (OUTPATIENT)
Dept: PRIMARY CARE | Facility: CLINIC | Age: 49
End: 2024-12-10
Payer: COMMERCIAL

## 2024-12-10 ENCOUNTER — LAB (OUTPATIENT)
Dept: LAB | Facility: LAB | Age: 49
End: 2024-12-10
Payer: COMMERCIAL

## 2024-12-10 VITALS
HEIGHT: 65 IN | HEART RATE: 69 BPM | WEIGHT: 197 LBS | SYSTOLIC BLOOD PRESSURE: 110 MMHG | BODY MASS INDEX: 32.82 KG/M2 | DIASTOLIC BLOOD PRESSURE: 70 MMHG | TEMPERATURE: 97.5 F | OXYGEN SATURATION: 99 %

## 2024-12-10 DIAGNOSIS — E55.9 VITAMIN D DEFICIENCY: ICD-10-CM

## 2024-12-10 DIAGNOSIS — E03.9 HYPOTHYROIDISM, UNSPECIFIED TYPE: ICD-10-CM

## 2024-12-10 DIAGNOSIS — E03.9 HYPOTHYROIDISM, UNSPECIFIED TYPE: Primary | ICD-10-CM

## 2024-12-10 LAB
25(OH)D3 SERPL-MCNC: 25 NG/ML (ref 30–100)
ALBUMIN SERPL BCP-MCNC: 4.1 G/DL (ref 3.4–5)
ALP SERPL-CCNC: 100 U/L (ref 33–110)
ALT SERPL W P-5'-P-CCNC: 32 U/L (ref 7–45)
ANION GAP SERPL CALC-SCNC: 15 MMOL/L (ref 10–20)
APPEARANCE UR: CLEAR
AST SERPL W P-5'-P-CCNC: 24 U/L (ref 9–39)
BILIRUB SERPL-MCNC: 0.6 MG/DL (ref 0–1.2)
BILIRUB UR STRIP.AUTO-MCNC: NEGATIVE MG/DL
BUN SERPL-MCNC: 12 MG/DL (ref 6–23)
CALCIUM SERPL-MCNC: 9.8 MG/DL (ref 8.6–10.6)
CHLORIDE SERPL-SCNC: 105 MMOL/L (ref 98–107)
CHOLEST SERPL-MCNC: 185 MG/DL (ref 0–199)
CHOLESTEROL/HDL RATIO: 2.7
CO2 SERPL-SCNC: 25 MMOL/L (ref 21–32)
COLOR UR: COLORLESS
CREAT SERPL-MCNC: 0.8 MG/DL (ref 0.5–1.05)
EGFRCR SERPLBLD CKD-EPI 2021: 90 ML/MIN/1.73M*2
ERYTHROCYTE [DISTWIDTH] IN BLOOD BY AUTOMATED COUNT: 17.3 % (ref 11.5–14.5)
GLUCOSE SERPL-MCNC: 86 MG/DL (ref 74–99)
GLUCOSE UR STRIP.AUTO-MCNC: NORMAL MG/DL
HCT VFR BLD AUTO: 44.4 % (ref 36–46)
HDLC SERPL-MCNC: 67.7 MG/DL
HGB BLD-MCNC: 14.9 G/DL (ref 12–16)
KETONES UR STRIP.AUTO-MCNC: NEGATIVE MG/DL
LDLC SERPL CALC-MCNC: 93 MG/DL
LEUKOCYTE ESTERASE UR QL STRIP.AUTO: ABNORMAL
MCH RBC QN AUTO: 25.9 PG (ref 26–34)
MCHC RBC AUTO-ENTMCNC: 33.6 G/DL (ref 32–36)
MCV RBC AUTO: 77 FL (ref 80–100)
NITRITE UR QL STRIP.AUTO: NEGATIVE
NON HDL CHOLESTEROL: 117 MG/DL (ref 0–149)
NRBC BLD-RTO: 0 /100 WBCS (ref 0–0)
PH UR STRIP.AUTO: 5.5 [PH]
PLATELET # BLD AUTO: 335 X10*3/UL (ref 150–450)
POTASSIUM SERPL-SCNC: 4.9 MMOL/L (ref 3.5–5.3)
PROT SERPL-MCNC: 7.1 G/DL (ref 6.4–8.2)
PROT UR STRIP.AUTO-MCNC: NEGATIVE MG/DL
RBC # BLD AUTO: 5.76 X10*6/UL (ref 4–5.2)
RBC # UR STRIP.AUTO: NEGATIVE /UL
RBC #/AREA URNS AUTO: NORMAL /HPF
SODIUM SERPL-SCNC: 140 MMOL/L (ref 136–145)
SP GR UR STRIP.AUTO: 1.01
TRIGL SERPL-MCNC: 124 MG/DL (ref 0–149)
TSH SERPL-ACNC: 0.48 MIU/L (ref 0.44–3.98)
UROBILINOGEN UR STRIP.AUTO-MCNC: NORMAL MG/DL
VIT B12 SERPL-MCNC: 313 PG/ML (ref 211–911)
VLDL: 25 MG/DL (ref 0–40)
WBC # BLD AUTO: 9.5 X10*3/UL (ref 4.4–11.3)
WBC #/AREA URNS AUTO: NORMAL /HPF

## 2024-12-10 PROCEDURE — 82607 VITAMIN B-12: CPT

## 2024-12-10 PROCEDURE — 36415 COLL VENOUS BLD VENIPUNCTURE: CPT

## 2024-12-10 PROCEDURE — 85027 COMPLETE CBC AUTOMATED: CPT

## 2024-12-10 PROCEDURE — 90471 IMMUNIZATION ADMIN: CPT | Performed by: FAMILY MEDICINE

## 2024-12-10 PROCEDURE — 80061 LIPID PANEL: CPT

## 2024-12-10 PROCEDURE — 1036F TOBACCO NON-USER: CPT | Performed by: FAMILY MEDICINE

## 2024-12-10 PROCEDURE — 99213 OFFICE O/P EST LOW 20 MIN: CPT | Performed by: FAMILY MEDICINE

## 2024-12-10 PROCEDURE — 90673 RIV3 VACCINE NO PRESERV IM: CPT | Performed by: FAMILY MEDICINE

## 2024-12-10 PROCEDURE — 80053 COMPREHEN METABOLIC PANEL: CPT

## 2024-12-10 PROCEDURE — 81001 URINALYSIS AUTO W/SCOPE: CPT

## 2024-12-10 PROCEDURE — 84443 ASSAY THYROID STIM HORMONE: CPT

## 2024-12-10 PROCEDURE — 3008F BODY MASS INDEX DOCD: CPT | Performed by: FAMILY MEDICINE

## 2024-12-10 PROCEDURE — 82306 VITAMIN D 25 HYDROXY: CPT

## 2024-12-10 RX ORDER — MELOXICAM 15 MG/1
TABLET ORAL
COMMUNITY
Start: 2024-10-30

## 2024-12-10 NOTE — PROGRESS NOTES
"Subjective   Patient ID: Juliana Maldonado is a 49 y.o. female who presents for Follow-up.    Assessment/Plan     Problem List Items Addressed This Visit       Hypothyroidism - Primary    Relevant Orders    Flu vaccine, trivalent, preservative free, no egg protein, age 18y+ (Flublok) (Completed)    TSH with reflex to Free T4 if abnormal    Lipid Panel    Comprehensive Metabolic Panel    CBC    Urinalysis with Reflex Microscopic    Vitamin B12    Vitamin D deficiency    Relevant Orders    Vitamin D 25-Hydroxy,Total (for eval of Vitamin D levels)       HPI      49-year-old female with    hysterectomy with bilateral salpingo-oophorectomy in July 2024 with complication with intra-abdominal abscess    Hypothyroidism    Cologuard  - -ve  3/24  Mammogram uptodate     No new s/s  Fasting for labs    Follow up in 6 months    Wants to flu vaccine    Allergies   Allergen Reactions    Oxycodone Other     too strong       Current Outpatient Medications   Medication Sig Dispense Refill    cholecalciferol (Vitamin D-3) 25 MCG (1000 UT) tablet Take by mouth.      fluticasone (Flonase) 50 mcg/actuation nasal spray Administer 1 spray into each nostril once daily. Shake gently. Before first use, prime pump. After use, clean tip and replace cap. 16 g 1    ipratropium (Atrovent) 21 mcg (0.03 %) nasal spray Administer 2 sprays into each nostril every 12 hours. 30 mL 2    levothyroxine (Synthroid) 112 mcg tablet Take 1 tablet (112 mcg) by mouth early in the morning.. Take on an empty stomach at the same time each day, either 30 to 60 minutes prior to breakfast 90 tablet 3    meloxicam (Mobic) 15 mg tablet        No current facility-administered medications for this visit.       Objective   Visit Vitals  /70 (BP Location: Left arm, Patient Position: Sitting)   Pulse 69   Temp 36.4 °C (97.5 °F)   Ht 1.651 m (5' 5\")   Wt 89.4 kg (197 lb)   SpO2 99%   BMI 32.78 kg/m²   OB Status Perimenopausal   Smoking Status Never   BSA 2.02 m² "     Physical Exam  Constitutional:       General: He is not in acute distress.     Appearance: Normal appearance.   HENT:      Head: Normocephalic and atraumatic.      Nose: Nose normal.   Eyes:      Extraocular Movements: Extraocular movements intact.      Conjunctiva/sclera: Conjunctivae normal.   Cardiovascular:      Rate and Rhythm: Normal rate and regular rhythm.   Pulmonary:      Effort: Pulmonary effort is normal.      Breath sounds: Normal breath sounds.   Skin:     General: Skin is warm.   Neurological:      Mental Status: He is alert and oriented to person, place, and time.   Psychiatric:         Mood and Affect: Mood normal.         Behavior: Behavior normal.     Abdomen positive bowel sounds soft nontender nondistended induration present around 4 to 5 cm above umbilicus with minimal redness  Immunization History   Administered Date(s) Administered    Flu vaccine (IIV4), preservative free *Check age/dose* 10/25/2016, 09/14/2017, 11/14/2019, 09/15/2020    Flu vaccine, quadrivalent, no egg protein, age 6 month or greater (FLUCELVAX) 11/14/2019, 10/08/2023    Flu vaccine, quadrivalent, recombinant, preservative free, adult (FLUBLOK) 09/28/2021, 09/19/2022    Flu vaccine, trivalent, preservative free, no egg protein, age 18y+ (Flublok) 12/10/2024    Moderna COVID-19 vaccine, 12 years and older (50mcg/0.5mL)(Spikevax) 10/08/2023    Moderna SARS-CoV-2 Vaccination 04/09/2021, 05/07/2021, 11/15/2021, 07/26/2022    Pfizer COVID-19 vaccine, bivalent, age 12 years and older (30 mcg/0.3 mL) 12/01/2022       Review of Systems    Orders Only on 09/05/2024   Component Date Value Ref Range Status    [facility] MCH 09/05/2024 26.0 (L)  27.0 - 34.0 pg Final    [facility] DxH Actions 09/05/2024 See Notes (A)   Final    [facility] HCT 09/05/2024 41.3  36.0 - 46.0 % Final    [facility] DIFF? 09/05/2024 No   Final    [facility] Platelet 09/05/2024 522 (H)  150 - 450 x10 Final    [facility] RBC 09/05/2024 5.15  4.20 - 5.40 x10  Final    [facility] Instr WBC 09/05/2024 11.6   Final    [facility] MCHC 09/05/2024 32.4  32.0 - 37.0 g/dL Final    [facility] MCV 09/05/2024 80.2  80.0 - 100.0 fL Final    [facility] MPV 09/05/2024 7.5  7.4 - 10.4 fL Final    [facility] HGB 09/05/2024 13.4  12.0 - 16.0 g/dL Final    [facility] Nucleated RBC 09/05/2024 0  /100WBC Final    [facility] WBC 09/05/2024 11.6 (H)  4.5 - 11.0 x10 Final    [facility] RDW 09/05/2024 19.8 (H)  11.5 - 14.5 % Final    [facility] Neutrophil % 09/05/2024 63.2  % Final    [facility] Neutrophil Count (ANC) 09/05/2024 7.36  1.40 - 8.80 x1000 Final    [facility] Eosin % 09/05/2024 1.9  % Final    [facility] Red Blood Cell Morpholo* 09/05/2024 See Notes (A)   Final    [facility] Lymph % 09/05/2024 26.0  % Final    [facility] Eos Count 09/05/2024 0.22  0.00 - 0.50 x1000 Final    [facility] Lymph Count 09/05/2024 3.03  1.20 - 4.80 x1000 Final    [facility] Basos % 09/05/2024 0.6  % Final    [facility] Mono % 09/05/2024 8.2  % Final    [facility] Baso Count 09/05/2024 0.07  0.00 - 0.20 x1000 Final    [facility] Mono Count 09/05/2024 0.95  0.10 - 1.00 x1000 Final    [facility] Chloride 09/05/2024 102  98 - 107 mmol/L Final    [facility] BUN/Creat Ratio 09/05/2024 11.2   Final    [facility] Na 09/05/2024 138  135 - 145 mmol/L Final    [facility] Creatinine 09/05/2024 0.8  0.5 - 0.8 mg/dL Final    [facility] GFR AA 09/05/2024 >60   Final    [facility] Calculated Osmolality 09/05/2024 274 (L)  275 - 295 mOsm/kg Final    [facility] CO2, venous 09/05/2024 27.0  20.0 - 31.0 mmol/L Final    [facility] K 09/05/2024 4.6  3.5 - 5.1 mmol/L Final    [facility] Calcium 09/05/2024 9.5  8.7 - 10.4 mg/dL Final    [facility] BUN 09/05/2024 9  9 - 23 mg/dL Final    [facility] Glucose 09/05/2024 98  74 - 106 mg/dL Final    [facility] Glomerular Filtration R* 09/05/2024 >60  mL/min/1.73m? Final    [facility] C-Reactive Protein, Bogdan* 09/05/2024 1.7 (H)  0.0 - 0.9 mg/dL Final    [facility] Procalcitonin  09/05/2024 <0.05  ng/mL Final   Orders Only on 08/29/2024   Component Date Value Ref Range Status    [facility] GFR AA 08/29/2024 >60   Final    [facility] CO2, venous 08/29/2024 28.0  20.0 - 31.0 mmol/L Final    [facility] K 08/29/2024 4.4  3.5 - 5.1 mmol/L Final    [facility] Calculated Osmolality 08/29/2024 271 (L)  275 - 295 mOsm/kg Final    [facility] Calcium 08/29/2024 9.5  8.7 - 10.4 mg/dL Final    [facility] Glucose 08/29/2024 83  74 - 106 mg/dL Final    [facility] BUN 08/29/2024 8 (L)  9 - 23 mg/dL Final    [facility] Glomerular Filtration R* 08/29/2024 >60  mL/min/1.73m? Final    [facility] Chloride 08/29/2024 102  98 - 107 mmol/L Final    [facility] Na 08/29/2024 137  135 - 145 mmol/L Final    [facility] BUN/Creat Ratio 08/29/2024 10.0   Final    [facility] Creatinine 08/29/2024 0.8  0.5 - 0.8 mg/dL Final    [facility] C-Reactive Protein, Bogdan* 08/29/2024 2.4 (H)  0.0 - 0.9 mg/dL Final    [facility] HCT 08/29/2024 38.5  36.0 - 46.0 % Final    [facility] Platelet 08/29/2024 563 (H)  150 - 450 x10 Final    [facility] RBC 08/29/2024 4.78  4.20 - 5.40 x10 Final    [facility] MCHC 08/29/2024 31.9 (L)  32.0 - 37.0 g/dL Final    [facility] DIFF? 08/29/2024 No   Final    [facility] MCV 08/29/2024 80.4  80.0 - 100.0 fL Final    [facility] WBC 08/29/2024 12.8 (H)  4.5 - 11.0 x10 Final    [facility] MPV 08/29/2024 7.4  7.4 - 10.4 fL Final    [facility] HGB 08/29/2024 12.3  12.0 - 16.0 g/dL Final    [facility] DxH Actions 08/29/2024 See Notes (A)   Final    [facility] RDW 08/29/2024 19.6 (H)  11.5 - 14.5 % Final    [facility] Instr WBC 08/29/2024 12.8   Final    [facility] MCH 08/29/2024 25.6 (L)  27.0 - 34.0 pg Final    [facility] Nucleated RBC 08/29/2024 0  /100WBC Final    [facility] Mono Count 08/29/2024 0.80  0.10 - 1.00 x1000 Final    [facility] Red Blood Cell Morpholo* 08/29/2024 See Notes (A)   Final    [facility] Neutrophil Count (ANC) 08/29/2024 9.83 (H)  1.40 - 8.80 x1000 Final    [facility] Basos %  08/29/2024 0.7  % Final    [facility] Baso Count 08/29/2024 0.09  0.00 - 0.20 x1000 Final    [facility] Lymph % 08/29/2024 15.7  % Final    [facility] Lymph Count 08/29/2024 2.01  1.20 - 4.80 x1000 Final    [facility] Eos Count 08/29/2024 0.08  0.00 - 0.50 x1000 Final    [facility] Neutrophil % 08/29/2024 76.8  % Final    [facility] Eosin % 08/29/2024 0.6  % Final    [facility] Scan Differential 08/29/2024 Diff Scd   Final    [facility] Mono % 08/29/2024 6.2  % Final    [facility] Procalcitonin 08/29/2024 0.06  ng/mL Final    [facility] STAT OP Call Back 08/29/2024 Results Called/Faxed   Final   Orders Only on 08/26/2024   Component Date Value Ref Range Status    [facility] HCT 08/26/2024 37.1  36.0 - 46.0 % Final    [facility] RBC 08/26/2024 4.61  4.20 - 5.40 x10 Final    [facility] Platelet 08/26/2024 502 (H)  150 - 450 x10 Final    [facility] MCHC 08/26/2024 32.1  32.0 - 37.0 g/dL Final    [facility] Instr WBC 08/26/2024 14.8   Final    [facility] DIFF? 08/26/2024 Yes   Final    [facility] MCV 08/26/2024 80.4  80.0 - 100.0 fL Final    [facility] MPV 08/26/2024 7.3 (L)  7.4 - 10.4 fL Final    [facility] HGB 08/26/2024 11.9 (L)  12.0 - 16.0 g/dL Final    [facility] RDW 08/26/2024 19.6 (H)  11.5 - 14.5 % Final    [facility] WBC 08/26/2024 14.8 (H)  4.5 - 11.0 x10 Final    [facility] DxH Actions 08/26/2024 See Notes (A)   Final    [facility] MCH 08/26/2024 25.9 (L)  27.0 - 34.0 pg Final    [facility] K 08/26/2024 4.5  3.5 - 5.1 mmol/L Final    [facility] Chloride 08/26/2024 103  98 - 107 mmol/L Final    [facility] Na 08/26/2024 136  135 - 145 mmol/L Final    [facility] Glucose 08/26/2024 86  74 - 106 mg/dL Final    [facility] CO2, venous 08/26/2024 26.0  20.0 - 31.0 mmol/L Final    [facility] GFR AA 08/26/2024 >60   Final    [facility] Glomerular Filtration R* 08/26/2024 >60  mL/min/1.73m? Final    [facility] BUN/Creat Ratio 08/26/2024 11.4   Final    [facility] Calcium 08/26/2024 9.3  8.7 - 10.4 mg/dL Final     [facility] Creatinine 08/26/2024 0.7  0.5 - 0.8 mg/dL Final    [facility] BUN 08/26/2024 8 (L)  9 - 23 mg/dL Final    [facility] Calculated Osmolality 08/26/2024 270 (L)  275 - 295 mOsm/kg Final    [facility] C-Reactive Protein, Bogdan* 08/26/2024 4.5 (H)  0.0 - 0.9 mg/dL Final    [facility] Absolute Neutrophil Ct 08/26/2024 11.98 (H)  1.40 - 8.80 x1000 Final    [facility] Lymphocyte % 08/26/2024 11  % Final    [facility] Segmented Neut % 08/26/2024 78  % Final    [facility] Absolute Seg Ct 08/26/2024 11.54 (H)  1.40 - 8.80 x1000 Final    [facility] Polychromasia 08/26/2024 Slight   Final    [facility] Absolute Lymph Ct 08/26/2024 1.63  1.20 - 4.80 x1000 Final    [facility] Monocyte % 08/26/2024 8  % Final    [facility] Band % 08/26/2024 3  % Final    [facility] Absolute Band Ct 08/26/2024 0.44  0.00 - 0.70 x1000 Final    [facility] Large Platelets 08/26/2024 Present   Final    [facility] Absolute [place] Ct 08/26/2024 1.18 (H)  0.10 - 1.00 x1000 Final    [facility] Anisocytosis 08/26/2024 Moderate   Final    [facility] Procalcitonin 08/26/2024 0.05  ng/mL Final   Orders Only on 08/22/2024   Component Date Value Ref Range Status    [facility] Chloride 08/22/2024 101  98 - 107 mmol/L Final    [facility] Glomerular Filtration R* 08/22/2024 >60  mL/min/1.73m? Final    [facility] BUN/Creat Ratio 08/22/2024 14.3   Final    [facility] Na 08/22/2024 134 (L)  135 - 145 mmol/L Final    [facility] Creatinine 08/22/2024 0.7  0.5 - 0.8 mg/dL Final    [facility] GFR AA 08/22/2024 >60   Final    [facility] CO2, venous 08/22/2024 24.0  20.0 - 31.0 mmol/L Final    [facility] K 08/22/2024 5.0  3.5 - 5.1 mmol/L Final    [facility] Calculated Osmolality 08/22/2024 266 (L)  275 - 295 mOsm/kg Final    [facility] Calcium 08/22/2024 9.7  8.7 - 10.4 mg/dL Final    [facility] BUN 08/22/2024 10  9 - 23 mg/dL Final    [facility] Glucose 08/22/2024 83  74 - 106 mg/dL Final    [facility] C-Reactive Protein, Bogdan* 08/22/2024 3.7 (H)  0.0 - 0.9 mg/dL  Final    [facility] MCV 08/22/2024 80.3  80.0 - 100.0 fL Final    [facility] MPV 08/22/2024 7.1 (L)  7.4 - 10.4 fL Final    [facility] DIFF? 08/22/2024 No   Final    [facility] RDW 08/22/2024 19.5 (H)  11.5 - 14.5 % Final    [facility] RBC 08/22/2024 4.64  4.20 - 5.40 x10 Final    [facility] MCH 08/22/2024 25.8 (L)  27.0 - 34.0 pg Final    [facility] WBC 08/22/2024 13.4 (H)  4.5 - 11.0 x10 Final    [facility] Instr WBC 08/22/2024 13.4   Final    [facility] HCT 08/22/2024 37.3  36.0 - 46.0 % Final    [facility] Platelet 08/22/2024 589 (H)  150 - 450 x10 Final    [facility] DxH Actions 08/22/2024 See Notes (A)   Final    [facility] HGB 08/22/2024 12.0  12.0 - 16.0 g/dL Final    [facility] Nucleated RBC 08/22/2024 0  /100WBC Final    [facility] MCHC 08/22/2024 32.1  32.0 - 37.0 g/dL Final    [facility] Mono % 08/22/2024 6.3  % Final    [facility] Baso Count 08/22/2024 0.07  0.00 - 0.20 x1000 Final    [facility] Neutrophil % 08/22/2024 74.0  % Final    [facility] Mono Count 08/22/2024 0.85  0.10 - 1.00 x1000 Final    [facility] Neutrophil Count (ANC) 08/22/2024 9.92 (H)  1.40 - 8.80 x1000 Final    [facility] Eosin % 08/22/2024 1.2  % Final    [facility] Red Blood Cell Morpholo* 08/22/2024 See Notes (A)   Final    [facility] Lymph % 08/22/2024 18.0  % Final    [facility] Eos Count 08/22/2024 0.16  0.00 - 0.50 x1000 Final    [facility] Lymph Count 08/22/2024 2.42  1.20 - 4.80 x1000 Final    [facility] Scan Differential 08/22/2024 Diff Scd   Final    [facility] Basos % 08/22/2024 0.5  % Final   Lab on 08/12/2024   Component Date Value Ref Range Status    WBC 08/12/2024 20.9 (H)  4.4 - 11.3 x10*3/uL Final    nRBC 08/12/2024 0.0  0.0 - 0.0 /100 WBCs Final    RBC 08/12/2024 3.98 (L)  4.00 - 5.20 x10*6/uL Final    Hemoglobin 08/12/2024 10.3 (L)  12.0 - 16.0 g/dL Final    Hematocrit 08/12/2024 33.8 (L)  36.0 - 46.0 % Final    MCV 08/12/2024 85  80 - 100 fL Final    MCH 08/12/2024 25.9 (L)  26.0 - 34.0 pg Final    MCHC  08/12/2024 30.5 (L)  32.0 - 36.0 g/dL Final    RDW 08/12/2024 18.5 (H)  11.5 - 14.5 % Final    Platelets 08/12/2024 866 (H)  150 - 450 x10*3/uL Final    Neutrophils % 08/12/2024 80.6  40.0 - 80.0 % Final    Immature Granulocytes %, Automated 08/12/2024 1.0 (H)  0.0 - 0.9 % Final    Lymphocytes % 08/12/2024 10.7  13.0 - 44.0 % Final    Monocytes % 08/12/2024 6.4  2.0 - 10.0 % Final    Eosinophils % 08/12/2024 0.9  0.0 - 6.0 % Final    Basophils % 08/12/2024 0.4  0.0 - 2.0 % Final    Neutrophils Absolute 08/12/2024 16.87 (H)  1.20 - 7.70 x10*3/uL Final    Immature Granulocytes Absolute, Au* 08/12/2024 0.21  0.00 - 0.70 x10*3/uL Final    Lymphocytes Absolute 08/12/2024 2.23  1.20 - 4.80 x10*3/uL Final    Monocytes Absolute 08/12/2024 1.34 (H)  0.10 - 1.00 x10*3/uL Final    Eosinophils Absolute 08/12/2024 0.19  0.00 - 0.70 x10*3/uL Final    Basophils Absolute 08/12/2024 0.09  0.00 - 0.10 x10*3/uL Final    Glucose 08/12/2024 131 (H)  74 - 99 mg/dL Final    Sodium 08/12/2024 132 (L)  136 - 145 mmol/L Final    Potassium 08/12/2024 4.9  3.5 - 5.3 mmol/L Final    Chloride 08/12/2024 97 (L)  98 - 107 mmol/L Final    Bicarbonate 08/12/2024 24  21 - 32 mmol/L Final    Anion Gap 08/12/2024 16  10 - 20 mmol/L Final    Urea Nitrogen 08/12/2024 10  6 - 23 mg/dL Final    Creatinine 08/12/2024 0.65  0.50 - 1.05 mg/dL Final    eGFR 08/12/2024 >90  >60 mL/min/1.73m*2 Final    Calcium 08/12/2024 8.9  8.6 - 10.6 mg/dL Final    Albumin 08/12/2024 3.1 (L)  3.4 - 5.0 g/dL Final    Alkaline Phosphatase 08/12/2024 115 (H)  33 - 110 U/L Final    Total Protein 08/12/2024 7.1  6.4 - 8.2 g/dL Final    AST 08/12/2024 14  9 - 39 U/L Final    Bilirubin, Total 08/12/2024 0.5  0.0 - 1.2 mg/dL Final    ALT 08/12/2024 11  7 - 45 U/L Final    C-Reactive Protein 08/12/2024 16.28 (H)  <1.00 mg/dL Final       Radiology: Reviewed imaging in powerchart.  ECG 12 Lead    Result Date: 7/8/2024  Normal sinus rhythm      No family history on file.  Social History      Socioeconomic History    Marital status:    Tobacco Use    Smoking status: Never    Smokeless tobacco: Never   Substance and Sexual Activity    Alcohol use: Never    Drug use: Never     Past Medical History:   Diagnosis Date    Alveolar proteinosis     PAP (pulmonary alveolar proteinosis)    Personal history of other endocrine, nutritional and metabolic disease     History of thyroid disease    Personal history of other medical treatment     History of mammogram     Past Surgical History:   Procedure Laterality Date    BREAST BIOPSY Left     benign    OTHER SURGICAL HISTORY  03/17/2021    Resection    OTHER SURGICAL HISTORY  02/09/2021    Tubal ligation       Charting was completed using voice recognition technology and may include unintended errors.

## 2024-12-11 ENCOUNTER — TELEPHONE (OUTPATIENT)
Dept: PRIMARY CARE | Facility: CLINIC | Age: 49
End: 2024-12-11
Payer: COMMERCIAL

## 2024-12-11 NOTE — TELEPHONE ENCOUNTER
----- Message from Deon Junior sent at 12/11/2024  2:49 PM EST -----  Labs look her vitamin D is low patient can take 1000 unit every day.  Rest of the labs look okay.

## 2025-02-04 ENCOUNTER — OFFICE VISIT (OUTPATIENT)
Dept: PRIMARY CARE | Facility: CLINIC | Age: 50
End: 2025-02-04
Payer: COMMERCIAL

## 2025-02-04 VITALS
HEIGHT: 65 IN | BODY MASS INDEX: 32.32 KG/M2 | OXYGEN SATURATION: 99 % | DIASTOLIC BLOOD PRESSURE: 80 MMHG | WEIGHT: 194 LBS | HEART RATE: 78 BPM | SYSTOLIC BLOOD PRESSURE: 110 MMHG | TEMPERATURE: 97.6 F

## 2025-02-04 DIAGNOSIS — R05.1 ACUTE COUGH: Primary | ICD-10-CM

## 2025-02-04 PROCEDURE — 3008F BODY MASS INDEX DOCD: CPT | Performed by: FAMILY MEDICINE

## 2025-02-04 PROCEDURE — 99213 OFFICE O/P EST LOW 20 MIN: CPT | Performed by: FAMILY MEDICINE

## 2025-02-04 PROCEDURE — 1036F TOBACCO NON-USER: CPT | Performed by: FAMILY MEDICINE

## 2025-02-04 RX ORDER — AZITHROMYCIN 250 MG/1
TABLET, FILM COATED ORAL
Qty: 6 TABLET | Refills: 0 | Status: SHIPPED | OUTPATIENT
Start: 2025-02-04 | End: 2025-02-09

## 2025-02-04 NOTE — PROGRESS NOTES
Subjective   Patient ID: Juliana Maldonado is a 49 y.o. female who presents for Cough (Sore throat for 4 days/Taking otc meds /No fever,, no n&v, no diarrhea ).    Assessment/Plan     Problem List Items Addressed This Visit    None  Visit Diagnoses       Acute cough    -  Primary    Relevant Medications    azithromycin (Zithromax) 250 mg tablet        Pharyngitis not improving will consider adding Z-Kirk risk-benefit discussed stay hydrated call us if symptoms are worsening  Patient agrees    HPI    49-year-old female complaining of cough congestion sore throat going on since last 5 to 6 days patient states initially she had sore throat and cough and then later on hoarseness of voice which is not improving using over-the-counter medications without much help    No chest pain shortness of breath no fever chills    Requesting antibiotic  Exam no vomiting or diarrhea    Allergies   Allergen Reactions    Oxycodone Other     too strong       Current Outpatient Medications   Medication Sig Dispense Refill    cholecalciferol (Vitamin D-3) 25 MCG (1000 UT) tablet Take by mouth.      fluticasone (Flonase) 50 mcg/actuation nasal spray Administer 1 spray into each nostril once daily. Shake gently. Before first use, prime pump. After use, clean tip and replace cap. 16 g 1    levothyroxine (Synthroid) 112 mcg tablet Take 1 tablet (112 mcg) by mouth early in the morning.. Take on an empty stomach at the same time each day, either 30 to 60 minutes prior to breakfast 90 tablet 3    azithromycin (Zithromax) 250 mg tablet Take 2 tablets (500 mg) by mouth once daily for 1 day, THEN 1 tablet (250 mg) once daily for 4 days. Take 2 tabs (500 mg) by mouth today, than 1 daily for 4 days.. 6 tablet 0    ipratropium (Atrovent) 21 mcg (0.03 %) nasal spray Administer 2 sprays into each nostril every 12 hours. 30 mL 2    meloxicam (Mobic) 15 mg tablet  (Patient not taking: Reported on 2/4/2025)       No current facility-administered  "medications for this visit.       Objective   Visit Vitals  /80 (BP Location: Left arm, Patient Position: Sitting)   Pulse 78   Temp 36.4 °C (97.6 °F)   Ht 1.651 m (5' 5\")   Wt 88 kg (194 lb)   SpO2 99%   BMI 32.28 kg/m²   OB Status Perimenopausal   Smoking Status Never   BSA 2.01 m²     Physical Exam  Constitutional:       General: He is not in acute distress.     Appearance: Normal appearance.   HENT:      Head: Normocephalic and atraumatic.      Nose: Nose normal.   Eyes:      Extraocular Movements: Extraocular movements intact.      Conjunctiva/sclera: Conjunctivae normal.   Cardiovascular:      Rate and Rhythm: Normal rate and regular rhythm.   Pulmonary:      Effort: Pulmonary effort is normal.      Breath sounds: Normal breath sounds.   Skin:     General: Skin is warm.   Neurological:      Mental Status: He is alert and oriented to person, place, and time.   Psychiatric:         Mood and Affect: Mood normal.         Behavior: Behavior normal.     Oropharynx mild erythema  Bilateral nasal mucosa edema and minimal erythema present  No maxillary sinus tenderness  Immunization History   Administered Date(s) Administered    Flu vaccine (IIV4), preservative free *Check age/dose* 10/25/2016, 09/14/2017, 11/14/2019, 09/15/2020    Flu vaccine, quadrivalent, no egg protein, age 6 month or greater (FLUCELVAX) 11/14/2019, 10/08/2023    Flu vaccine, quadrivalent, recombinant, preservative free, adult (FLUBLOK) 09/28/2021, 09/19/2022    Flu vaccine, trivalent, preservative free, no egg protein, age 18y+ (Flublok) 12/10/2024    Moderna COVID-19 vaccine, 12 years and older (50mcg/0.5mL)(Spikevax) 10/08/2023    Moderna SARS-CoV-2 Vaccination 04/09/2021, 05/07/2021, 11/15/2021, 07/26/2022    Pfizer COVID-19 vaccine, bivalent, age 12 years and older (30 mcg/0.3 mL) 12/01/2022       Review of Systems    Lab on 12/10/2024   Component Date Value Ref Range Status    Thyroid Stimulating Hormone 12/10/2024 0.48  0.44 - 3.98 " mIU/L Final    Cholesterol 12/10/2024 185  0 - 199 mg/dL Final    HDL-Cholesterol 12/10/2024 67.7  mg/dL Final    Cholesterol/HDL Ratio 12/10/2024 2.7   Final    LDL Calculated 12/10/2024 93  <=99 mg/dL Final    VLDL 12/10/2024 25  0 - 40 mg/dL Final    Triglycerides 12/10/2024 124  0 - 149 mg/dL Final    Non HDL Cholesterol 12/10/2024 117  0 - 149 mg/dL Final    Glucose 12/10/2024 86  74 - 99 mg/dL Final    Sodium 12/10/2024 140  136 - 145 mmol/L Final    Potassium 12/10/2024 4.9  3.5 - 5.3 mmol/L Final    Chloride 12/10/2024 105  98 - 107 mmol/L Final    Bicarbonate 12/10/2024 25  21 - 32 mmol/L Final    Anion Gap 12/10/2024 15  10 - 20 mmol/L Final    Urea Nitrogen 12/10/2024 12  6 - 23 mg/dL Final    Creatinine 12/10/2024 0.80  0.50 - 1.05 mg/dL Final    eGFR 12/10/2024 90  >60 mL/min/1.73m*2 Final    Calcium 12/10/2024 9.8  8.6 - 10.6 mg/dL Final    Albumin 12/10/2024 4.1  3.4 - 5.0 g/dL Final    Alkaline Phosphatase 12/10/2024 100  33 - 110 U/L Final    Total Protein 12/10/2024 7.1  6.4 - 8.2 g/dL Final    AST 12/10/2024 24  9 - 39 U/L Final    Bilirubin, Total 12/10/2024 0.6  0.0 - 1.2 mg/dL Final    ALT 12/10/2024 32  7 - 45 U/L Final    WBC 12/10/2024 9.5  4.4 - 11.3 x10*3/uL Final    nRBC 12/10/2024 0.0  0.0 - 0.0 /100 WBCs Final    RBC 12/10/2024 5.76 (H)  4.00 - 5.20 x10*6/uL Final    Hemoglobin 12/10/2024 14.9  12.0 - 16.0 g/dL Final    Hematocrit 12/10/2024 44.4  36.0 - 46.0 % Final    MCV 12/10/2024 77 (L)  80 - 100 fL Final    MCH 12/10/2024 25.9 (L)  26.0 - 34.0 pg Final    MCHC 12/10/2024 33.6  32.0 - 36.0 g/dL Final    RDW 12/10/2024 17.3 (H)  11.5 - 14.5 % Final    Platelets 12/10/2024 335  150 - 450 x10*3/uL Final    Color, Urine 12/10/2024 Colorless (N)  Light-Yellow, Yellow, Dark-Yellow Final    Appearance, Urine 12/10/2024 Clear  Clear Final    Specific Gravity, Urine 12/10/2024 1.011  1.005 - 1.035 Final    pH, Urine 12/10/2024 5.5  5.0, 5.5, 6.0, 6.5, 7.0, 7.5, 8.0 Final    Protein, Urine  12/10/2024 NEGATIVE  NEGATIVE, 10 (TRACE), 20 (TRACE) mg/dL Final    Glucose, Urine 12/10/2024 Normal  Normal mg/dL Final    Blood, Urine 12/10/2024 NEGATIVE  NEGATIVE Final    Ketones, Urine 12/10/2024 NEGATIVE  NEGATIVE mg/dL Final    Bilirubin, Urine 12/10/2024 NEGATIVE  NEGATIVE Final    Urobilinogen, Urine 12/10/2024 Normal  Normal mg/dL Final    Nitrite, Urine 12/10/2024 NEGATIVE  NEGATIVE Final    Leukocyte Esterase, Urine 12/10/2024 250 Sukumar/µL (A)  NEGATIVE Final    Vitamin D, 25-Hydroxy, Total 12/10/2024 25 (L)  30 - 100 ng/mL Final    Vitamin B12 12/10/2024 313  211 - 911 pg/mL Final    WBC, Urine 12/10/2024 NONE  1-5, NONE /HPF Final    RBC, Urine 12/10/2024 1-2  NONE, 1-2, 3-5 /HPF Final       Radiology: Reviewed imaging in powerchart.  No results found.    No family history on file.  Social History     Socioeconomic History    Marital status:    Tobacco Use    Smoking status: Never    Smokeless tobacco: Never   Substance and Sexual Activity    Alcohol use: Never    Drug use: Never     Past Medical History:   Diagnosis Date    Alveolar proteinosis     PAP (pulmonary alveolar proteinosis)    Personal history of other endocrine, nutritional and metabolic disease     History of thyroid disease    Personal history of other medical treatment     History of mammogram     Past Surgical History:   Procedure Laterality Date    BREAST BIOPSY Left     benign    OTHER SURGICAL HISTORY  03/17/2021    Resection    OTHER SURGICAL HISTORY  02/09/2021    Tubal ligation       Charting was completed using voice recognition technology and may include unintended errors.

## 2025-02-24 ENCOUNTER — TELEPHONE (OUTPATIENT)
Dept: PRIMARY CARE | Facility: CLINIC | Age: 50
End: 2025-02-24
Payer: COMMERCIAL

## 2025-02-24 DIAGNOSIS — R05.1 ACUTE COUGH: ICD-10-CM

## 2025-02-24 RX ORDER — BROMPHENIRAMINE MALEATE, PSEUDOEPHEDRINE HYDROCHLORIDE, AND DEXTROMETHORPHAN HYDROBROMIDE 2; 30; 10 MG/5ML; MG/5ML; MG/5ML
5 SYRUP ORAL 4 TIMES DAILY PRN
Qty: 120 ML | Refills: 0 | Status: SHIPPED | OUTPATIENT
Start: 2025-02-24 | End: 2025-03-06

## 2025-02-24 RX ORDER — BENZONATATE 100 MG/1
100 CAPSULE ORAL 3 TIMES DAILY PRN
Qty: 42 CAPSULE | Refills: 0 | Status: SHIPPED | OUTPATIENT
Start: 2025-02-24 | End: 2025-03-26

## 2025-03-03 ENCOUNTER — TELEMEDICINE (OUTPATIENT)
Dept: PRIMARY CARE | Facility: CLINIC | Age: 50
End: 2025-03-03
Payer: COMMERCIAL

## 2025-03-03 VITALS — WEIGHT: 184 LBS | HEIGHT: 65 IN | BODY MASS INDEX: 30.66 KG/M2

## 2025-03-03 DIAGNOSIS — A49.9 BACTERIAL INFECTION: Primary | ICD-10-CM

## 2025-03-03 DIAGNOSIS — E55.9 VITAMIN D DEFICIENCY: ICD-10-CM

## 2025-03-03 DIAGNOSIS — E03.9 HYPOTHYROIDISM, UNSPECIFIED TYPE: ICD-10-CM

## 2025-03-03 DIAGNOSIS — K21.9 GASTROESOPHAGEAL REFLUX DISEASE, UNSPECIFIED WHETHER ESOPHAGITIS PRESENT: ICD-10-CM

## 2025-03-03 DIAGNOSIS — M76.61 TENDONITIS, ACHILLES, RIGHT: ICD-10-CM

## 2025-03-03 PROCEDURE — 1036F TOBACCO NON-USER: CPT | Performed by: EMERGENCY MEDICINE

## 2025-03-03 PROCEDURE — 99213 OFFICE O/P EST LOW 20 MIN: CPT | Performed by: EMERGENCY MEDICINE

## 2025-03-03 PROCEDURE — 3008F BODY MASS INDEX DOCD: CPT | Performed by: EMERGENCY MEDICINE

## 2025-03-03 RX ORDER — DOXYCYCLINE HYCLATE 100 MG
100 TABLET ORAL 2 TIMES DAILY
Qty: 14 TABLET | Refills: 0 | Status: SHIPPED | OUTPATIENT
Start: 2025-03-03 | End: 2025-03-10

## 2025-03-03 NOTE — PROGRESS NOTES
Subjective   Patient ID: Juliana Maldonado is a 49 y.o. female who presents for Sore Throat.    Assessment/Plan   Problem List Items Addressed This Visit    None  Visit Diagnoses       Bacterial infection    -  Primary    Relevant Medications    doxycycline (Vibra-Tabs) 100 mg tablet          Pharyngitis-since patient did not benefit with Z-Kirk, will call in doxycycline    Asthma/COPD-takes Atrovent    Hypothyroidism-on Synthroid    Arthritis-continue Mobic    Follow-up as needed  Source of history: Nurse, Medical personnel, Medical record, Patient.  History limitation: None.    HPI   This visit was completed virtually due to the restrictions of the COVID-19 pandemic. All issues as below were discussed and addressed but no physical exam was performed. If it was felt that the patient should be evaluated in clinic or ER, then they were directed there. The patient verbally consented to visit    Runny nose coughing sore throat    Was given Z-Kirk in February  Got little better for some time but then symptoms came back      Allergies   Allergen Reactions    Oxycodone Other     too strong       Current Outpatient Medications   Medication Sig Dispense Refill    benzonatate (Tessalon) 100 mg capsule Take 1 capsule (100 mg) by mouth 3 times a day as needed for cough. Do not crush or chew. 42 capsule 0    brompheniramine-pseudoeph-DM 2-30-10 mg/5 mL syrup Take 5 mL by mouth 4 times a day as needed for congestion for up to 10 days. 120 mL 0    cholecalciferol (Vitamin D-3) 25 MCG (1000 UT) tablet Take by mouth.      fluticasone (Flonase) 50 mcg/actuation nasal spray Administer 1 spray into each nostril once daily. Shake gently. Before first use, prime pump. After use, clean tip and replace cap. 16 g 1    levothyroxine (Synthroid) 112 mcg tablet Take 1 tablet (112 mcg) by mouth early in the morning.. Take on an empty stomach at the same time each day, either 30 to 60 minutes prior to breakfast 90 tablet 3    doxycycline  (Vibra-Tabs) 100 mg tablet Take 1 tablet (100 mg) by mouth 2 times a day for 7 days. Take with a full glass of water and do not lie down for at least 30 minutes after. 14 tablet 0    ipratropium (Atrovent) 21 mcg (0.03 %) nasal spray Administer 2 sprays into each nostril every 12 hours. 30 mL 2    meloxicam (Mobic) 15 mg tablet  (Patient not taking: Reported on 3/3/2025)       No current facility-administered medications for this visit.     Patient was not physically examined as this is a virtual visit    Review of Systems   Comprehensive review of systems as allowed by patient condition and nursing input is negative    Results including lab work, imaging reports were reviewed and wherever possible, independently verified    No family history on file.  Social History     Socioeconomic History    Marital status:    Tobacco Use    Smoking status: Never    Smokeless tobacco: Never   Substance and Sexual Activity    Alcohol use: Never    Drug use: Never     Past Medical History:   Diagnosis Date    Alveolar proteinosis     PAP (pulmonary alveolar proteinosis)    Personal history of other endocrine, nutritional and metabolic disease     History of thyroid disease    Personal history of other medical treatment     History of mammogram     Past Surgical History:   Procedure Laterality Date    BREAST BIOPSY Left     benign    OTHER SURGICAL HISTORY  03/17/2021    Resection    OTHER SURGICAL HISTORY  02/09/2021    Tubal ligation       Charting was completed using voice recognition technology and may include unintended errors.

## 2025-07-25 DIAGNOSIS — E03.9 HYPOTHYROIDISM, UNSPECIFIED TYPE: ICD-10-CM

## 2025-07-25 RX ORDER — LEVOTHYROXINE SODIUM 112 UG/1
TABLET ORAL
Qty: 90 TABLET | Refills: 0 | Status: SHIPPED | OUTPATIENT
Start: 2025-07-25

## 2025-09-04 ENCOUNTER — APPOINTMENT (OUTPATIENT)
Dept: PRIMARY CARE | Facility: CLINIC | Age: 50
End: 2025-09-04
Payer: COMMERCIAL

## 2025-09-04 PROBLEM — Z00.00 VISIT FOR PREVENTIVE HEALTH EXAMINATION: Status: ACTIVE | Noted: 2025-09-04

## 2025-09-04 ASSESSMENT — PATIENT HEALTH QUESTIONNAIRE - PHQ9
2. FEELING DOWN, DEPRESSED OR HOPELESS: NOT AT ALL
SUM OF ALL RESPONSES TO PHQ9 QUESTIONS 1 AND 2: 0
1. LITTLE INTEREST OR PLEASURE IN DOING THINGS: NOT AT ALL